# Patient Record
Sex: FEMALE | Race: WHITE | NOT HISPANIC OR LATINO | Employment: OTHER | URBAN - METROPOLITAN AREA
[De-identification: names, ages, dates, MRNs, and addresses within clinical notes are randomized per-mention and may not be internally consistent; named-entity substitution may affect disease eponyms.]

---

## 2020-09-23 PROBLEM — E10.10 DIABETIC KETOACIDOSIS WITHOUT COMA ASSOCIATED WITH TYPE 1 DIABETES MELLITUS (HCC): Status: ACTIVE | Noted: 2020-09-23

## 2020-09-23 PROBLEM — R74.8 ELEVATED LIPASE: Status: ACTIVE | Noted: 2020-09-23

## 2020-09-25 PROBLEM — E83.51 HYPOCALCEMIA: Status: ACTIVE | Noted: 2020-09-25

## 2022-07-11 ENCOUNTER — OFFICE VISIT (OUTPATIENT)
Dept: DERMATOLOGY | Facility: CLINIC | Age: 37
End: 2022-07-11
Payer: COMMERCIAL

## 2022-07-11 DIAGNOSIS — L80 VITILIGO: Primary | ICD-10-CM

## 2022-07-11 PROCEDURE — 96910 PHOTCHMTX TAR&UVB/PTRLTM&UVB: CPT | Performed by: DERMATOLOGY

## 2022-07-11 NOTE — PROGRESS NOTES
PHOTOTHERAPY    Treatment type: Phototherapy  Diagnosis: Vitiligo  Anatomical location: Upper extremity, Lower extremity  Treatment schedule: 2x weekly  Reaction: None  Increase %: 10%  mJoules: 927 (dose reduced by 50% due to pt missing 3 weeks of treatment)  Max dose: 2000  Topical: Mineral oil  Topical applied by: Patient  Special shield: Goggjanna  Tech: Siena Lau RN  Comments: Next appt:7/13

## 2022-07-13 ENCOUNTER — OFFICE VISIT (OUTPATIENT)
Dept: DERMATOLOGY | Facility: CLINIC | Age: 37
End: 2022-07-13
Payer: COMMERCIAL

## 2022-07-13 DIAGNOSIS — L80 VITILIGO: Primary | ICD-10-CM

## 2022-07-13 PROCEDURE — 96910 PHOTCHMTX TAR&UVB/PTRLTM&UVB: CPT | Performed by: DERMATOLOGY

## 2022-07-13 NOTE — PROGRESS NOTES
PHOTOTHERAPY    Treatment type: Phototherapy  Diagnosis: Vitiligo  Anatomical location: Upper extremity, Lower extremity  Treatment schedule: 2x weekly  Reaction: None  Increase %: 10%  mJoules: 1021  Max dose: 2000  Topical: Mineral oil  Topical applied by: Patient  Special shield: Laurie  Tech: Norris Bradford RN  Comments: Next appt: 7/25

## 2022-07-25 ENCOUNTER — TELEPHONE (OUTPATIENT)
Dept: DERMATOLOGY | Facility: CLINIC | Age: 37
End: 2022-07-25

## 2022-07-25 NOTE — TELEPHONE ENCOUNTER
Pt unable to make apt today (son is sick) nor Wed (son has doctors apt), pt sees the physician on Wed, 8/17 and will cb to schedule phototherapy apts after apt

## 2022-07-29 ENCOUNTER — OFFICE VISIT (OUTPATIENT)
Dept: DERMATOLOGY | Facility: CLINIC | Age: 37
End: 2022-07-29
Payer: COMMERCIAL

## 2022-07-29 DIAGNOSIS — L80 VITILIGO: Primary | ICD-10-CM

## 2022-07-29 PROCEDURE — 96910 PHOTCHMTX TAR&UVB/PTRLTM&UVB: CPT | Performed by: DERMATOLOGY

## 2022-07-29 NOTE — PROGRESS NOTES
PHOTOTHERAPY    Treatment type: Phototherapy  Diagnosis: Vitiligo  Anatomical location: Upper extremity, Lower extremity  Treatment schedule: 2x weekly  Reaction: None  Increase %: 10%  mJoules: 768 (dose decreased by 25% due to pt misisng 2 weeks of treatment)  Max dose: 2000  Topical: Mineral oil  Topical applied by: Patient  Special shield: Goggles  Tech: Glenn Patel RN  Comments: Next appt: 8/23

## 2022-08-17 ENCOUNTER — OFFICE VISIT (OUTPATIENT)
Dept: MULTI SPECIALTY CLINIC | Facility: CLINIC | Age: 37
End: 2022-08-17

## 2022-08-17 DIAGNOSIS — L80 VITILIGO: Primary | ICD-10-CM

## 2022-08-17 PROCEDURE — 99213 OFFICE O/P EST LOW 20 MIN: CPT | Performed by: DERMATOLOGY

## 2022-08-17 NOTE — PROGRESS NOTES
[facility] Follow Up Note    Patient Name: Vern Trevino  Encounter Date: 08/17/2022    Today's Chief Concerns:  [employer] Concern #1:  vitiligo fu      Current Medications:    Current Outpatient Medications:     Accu-Chek FastClix Lancets MISC, USE TO TEST BLOOD SUGAR 6 TIMES A DAY, Disp: , Rfl:     Accu-Chek Guide test strip, USE UP TO 6 TIMES A DAY, Disp: , Rfl:     Biotin 1 MG CAPS, Take by mouth, Disp: , Rfl:     calcium carbonate-vitamin D (OSCAL-D) 500 mg-200 units per tablet, Take 1 tablet by mouth 3 (three) times a day with meals, Disp: 90 tablet, Rfl: 0    insulin aspart (NovoLOG) 100 units/mL injection, Inject under the skin continuous  Via pump, Disp: , Rfl:     insulin lispro (Admelog) 100 units/mL injection, INJECT 60 UNITS VIA INSULIN PUMP DAILY, Disp: , Rfl:     multivitamin (THERAGRAN) TABS, Take 1 tablet by mouth daily, Disp: , Rfl:     naproxen (NAPROSYN) 500 mg tablet, Take 500 mg by mouth, Disp: , Rfl:     tacrolimus (PROTOPIC) 0 1 % ointment, Apply topically 2 (two) times a day, Disp: 60 g, Rfl: 1    CONSTITUTIONAL:   There were no vitals filed for this visit  Specific Alerts:    Have you been seen by a [facility] Dermatologist in the last 3 years? YES    Are you pregnant or planning to become pregnant? No    Are you currently or planning to be nursing or breast feeding? No    Allergies   Allergen Reactions    Augmentin [Amoxicillin-Pot Clavulanate] Rash       May we call your Preferred Phone number to discuss your specific medical information? No    May we leave a detailed message that includes your specific medical information? No    Have you traveled outside of the [facility] in the past 3 months? No      Review of Systems:  Have you recently had or currently have any of the following?     · Fever or chills: No  · Night Sweats: No  · Headaches: No  · Weight Gain: No  · Weight Loss: No  · Blurry Vision: No  · Nausea: No  · Vomiting: No  · Diarrhea: No  · Blood in Stool: No  · Abdominal Pain: No  · Itchy Skin: No  · Painful Joints: No  · Swollen Joints: No  · Muscle Pain: No  · Irregular Mole: No  · Sun Burn: No  · Dry Skin: No  · Skin Color Changes: No  · Scar or Keloid: No  · Cold Sores/Fever Blisters: No  · Bacterial Infections/MRSA: No  · Anxiety: No  · Depression: No  · Suicidal or Homicidal Thoughts: No      PSYCH: Normal mood and affect  EYES: Normal conjunctiva  ENT: Normal lips and oral mucosa  CARDIOVASCULAR: No edema  RESPIRATORY: Normal respirations  HEME/LYMPH/IMMUNO:  No regional lymphadenopathy except as noted below in ASSESSMENT AND PLAN BY DIAGNOSIS    FULL ORGAN SYSTEM SKIN EXAM (SKIN)   Hair, Scalp, Ears, Face Normal except as noted below in Assessment   Neck,  Normal except as noted below in Assessment   Right Arm/Hand/Fingers Normal except as noted below in Assessment   Left Arm/Hand/Fingers Normal except as noted below in Assessment   Right Leg, Foot, Toes Normal except as noted below in Assessment   Left Leg, Foot, Toes Normal except as noted below in Assessment       VITILIGO     Physical Exam:  § Anatomic Location Affected:  Bilateral forearms, hands, lower legs, feet  § Morphological Description:  Depigmented patches with perifollicular repigmentation on forearms, dorsal hands and fingers, and lower legs, improved compared to prior  § Pertinent Positives:  § Pertinent Negatives:     Assessment and Plan:  Based on a thorough discussion of this condition and the management approach to it (including a comprehensive discussion of the known risks, side effects and potential benefits of treatment), the patient (family) agrees to implement the following specific plan:  § Patient is responding well to treatment  Continue phototherapy 2-3 times per week  § Continue topical treatment with protopic 0 1% ointment   Apply two times daily to areas of pigment loss  § Follow up in 6 months                 Phototherapy Initiation  Skin Type:  II     Skin type Typical features Tanning ability    I Pale white skin, blue/green eyes, blond/red hair Always burns, does not tan   II Fair skin, blue eyes Burns easily, tans poorly   III Darker white skin Tans after initial burn   IV Light brown skin Burns minimally, tans easily   V Brown skin Rarely burns, tans darkly easily   VI Dark brown or black skin Never burns, always tans darkly         Treatment Type:  UVB     Schedule:  2 X WEEK      Secondary Treatment:  No     Topical Application:  Mineral oil      Starting Mjoules:  Skin type I & II : as per most recent treatment ; maximum  2000 mJ     Increase by 5-10% at each treatment     Diagnosis: Vitiligo     Recommendations: Apply mineral oil prior to phototherapy   Continue treatment 2-3 times per week

## 2022-08-17 NOTE — PATIENT INSTRUCTIONS
VITILIGO          Assessment and Plan:  Based on a thorough discussion of this condition and the management approach to it (including a comprehensive discussion of the known risks, side effects and potential benefits of treatment), the patient (family) agrees to implement the following specific plan:  Patient is responding well to treatment  Continue phototherapy 2-3 times per week  Continue topical treatment with protopic 0 1% ointment   Apply two times daily to areas of pigment loss  Follow up in 6 months

## 2022-08-25 ENCOUNTER — OFFICE VISIT (OUTPATIENT)
Dept: DERMATOLOGY | Facility: CLINIC | Age: 37
End: 2022-08-25
Payer: COMMERCIAL

## 2022-08-25 DIAGNOSIS — L80 VITILIGO: Primary | ICD-10-CM

## 2022-08-25 PROCEDURE — 96910 PHOTCHMTX TAR&UVB/PTRLTM&UVB: CPT | Performed by: DERMATOLOGY

## 2022-08-25 NOTE — PROGRESS NOTES
PHOTOTHERAPY    Treatment type: Phototherapy  Diagnosis: Vitiligo  Anatomical location: Upper extremity, Lower extremity  Treatment schedule: 2x weekly  Reaction: None  Increase %: 10%  mJoules: 390 (Dose decreased by 50% due to pt missing three weeks of treatment)  Max dose: 2000  Topical: Mineral oil  Topical applied by: Patient  Special shield: Laurie  Tech: [facility] RN  Comments: Next appt: 8/30

## 2022-08-30 ENCOUNTER — OFFICE VISIT (OUTPATIENT)
Dept: DERMATOLOGY | Facility: CLINIC | Age: 37
End: 2022-08-30
Payer: COMMERCIAL

## 2022-08-30 DIAGNOSIS — L80 VITILIGO: Primary | ICD-10-CM

## 2022-08-30 PROCEDURE — 96910 PHOTCHMTX TAR&UVB/PTRLTM&UVB: CPT | Performed by: DERMATOLOGY

## 2022-08-30 NOTE — PROGRESS NOTES
PHOTOTHERAPY    Treatment type: Phototherapy  Diagnosis: Vitiligo  Anatomical location: Upper extremity, Lower extremity  Treatment schedule: 2x weekly  Reaction: None  Increase %: 10%  mJoules: 435  Max dose: 2000  Topical: Mineral oil  Topical applied by: Patient  Special shield: Laurie  Tech: Dg Client RN  Comments: Next appt: 9/1

## 2022-09-01 ENCOUNTER — OFFICE VISIT (OUTPATIENT)
Dept: DERMATOLOGY | Facility: CLINIC | Age: 37
End: 2022-09-01
Payer: COMMERCIAL

## 2022-09-01 DIAGNOSIS — L80 VITILIGO: Primary | ICD-10-CM

## 2022-09-01 PROCEDURE — 96910 PHOTCHMTX TAR&UVB/PTRLTM&UVB: CPT | Performed by: DERMATOLOGY

## 2022-09-01 NOTE — PROGRESS NOTES
PHOTOTHERAPY    Treatment type: Phototherapy  Diagnosis: Vitiligo  Anatomical location: Upper extremity, Lower extremity  Treatment schedule: 2x weekly  Reaction: None  Increase %: 10%  mJoules: 480  Max dose: 2000  Topical: Mineral oil  Topical applied by: Patient  Special shield: Goggles  Tech: Renae Martinez RN  Comments: Next appt: 9/6

## 2022-09-06 ENCOUNTER — OFFICE VISIT (OUTPATIENT)
Dept: DERMATOLOGY | Facility: CLINIC | Age: 37
End: 2022-09-06
Payer: COMMERCIAL

## 2022-09-06 DIAGNOSIS — L80 VITILIGO: Primary | ICD-10-CM

## 2022-09-06 PROCEDURE — 96910 PHOTCHMTX TAR&UVB/PTRLTM&UVB: CPT | Performed by: DERMATOLOGY

## 2022-09-06 NOTE — PROGRESS NOTES
PHOTOTHERAPY    Treatment type: Phototherapy  Diagnosis: Vitiligo  Anatomical location: Upper extremity, Lower extremity  Treatment schedule: 2x weekly  Reaction: None  Increase %: 10%  mJoules: 529  Max dose: 2000  Topical: Mineral oil  Topical applied by: Patient  Special shield: Laurie  Tech: Kelsey Khan RN  Comments: Next appt: 9/8

## 2022-09-08 ENCOUNTER — OFFICE VISIT (OUTPATIENT)
Dept: DERMATOLOGY | Facility: CLINIC | Age: 37
End: 2022-09-08
Payer: COMMERCIAL

## 2022-09-08 DIAGNOSIS — L80 VITILIGO: Primary | ICD-10-CM

## 2022-09-08 PROCEDURE — 96910 PHOTCHMTX TAR&UVB/PTRLTM&UVB: CPT | Performed by: DERMATOLOGY

## 2022-09-08 NOTE — PROGRESS NOTES
PHOTOTHERAPY    Treatment type: Phototherapy  Diagnosis: Vitiligo  Anatomical location: Upper extremity, Lower extremity  Treatment schedule: 2x weekly  Reaction: None  Increase %: 10%  mJoules: 587  Max dose: 2000  Topical: Mineral oil  Topical applied by: Patient  Special shield: Goggjanna  Tech: Leeann Zavaleta RN  Comments: Next appt: 9/14

## 2022-09-14 ENCOUNTER — OFFICE VISIT (OUTPATIENT)
Dept: DERMATOLOGY | Facility: CLINIC | Age: 37
End: 2022-09-14
Payer: COMMERCIAL

## 2022-09-14 DIAGNOSIS — L80 VITILIGO: Primary | ICD-10-CM

## 2022-09-14 PROCEDURE — 96910 PHOTCHMTX TAR&UVB/PTRLTM&UVB: CPT | Performed by: DERMATOLOGY

## 2022-09-14 NOTE — PROGRESS NOTES
PHOTOTHERAPY    Treatment type: Phototherapy  Diagnosis: Vitiligo  Anatomical location: Upper extremity, Lower extremity  Treatment schedule: 2x weekly  Reaction: None  Increase %: 10%  mJoules: 648  Max dose: 2000  Topical: Mineral oil  Topical applied by: Patient  Special shield: Laurie  Tech: Lucas Quezada RN  Comments: Next appt: 9/19

## 2022-09-19 ENCOUNTER — OFFICE VISIT (OUTPATIENT)
Dept: DERMATOLOGY | Facility: CLINIC | Age: 37
End: 2022-09-19
Payer: COMMERCIAL

## 2022-09-19 DIAGNOSIS — L80 VITILIGO: Primary | ICD-10-CM

## 2022-09-19 PROCEDURE — 96910 PHOTCHMTX TAR&UVB/PTRLTM&UVB: CPT | Performed by: DERMATOLOGY

## 2022-09-19 NOTE — PROGRESS NOTES
PHOTOTHERAPY    Treatment type: Phototherapy  Diagnosis: Vitiligo  Anatomical location: Upper extremity, Lower extremity  Treatment schedule: 2x weekly  Reaction: None  Increase %: 10%  mJoules: 715  Max dose: 2000  Topical: Mineral oil  Topical applied by: Patient  Special shield: Laurie  Tech: Kena Colorado RN  Comments: Next appt: 9/21

## 2022-09-21 ENCOUNTER — OFFICE VISIT (OUTPATIENT)
Dept: DERMATOLOGY | Facility: CLINIC | Age: 37
End: 2022-09-21
Payer: COMMERCIAL

## 2022-09-21 DIAGNOSIS — L80 VITILIGO: Primary | ICD-10-CM

## 2022-09-21 PROCEDURE — 96910 PHOTCHMTX TAR&UVB/PTRLTM&UVB: CPT | Performed by: DERMATOLOGY

## 2022-09-21 NOTE — PROGRESS NOTES
PHOTOTHERAPY    Treatment type: Phototherapy  Diagnosis: Vitiligo  Anatomical location: Upper extremity, Lower extremity  Treatment schedule: 2x weekly  Reaction: Pink  Increase %: 10%  mJoules: 719 (dose held the same)  Max dose: 2000  Topical: Mineral oil  Topical applied by: Patient  Special shield: Goggles  Tech: Dru Collado RN  Comments: Next appt: 9/26

## 2022-09-26 ENCOUNTER — OFFICE VISIT (OUTPATIENT)
Dept: DERMATOLOGY | Facility: CLINIC | Age: 37
End: 2022-09-26
Payer: COMMERCIAL

## 2022-09-26 DIAGNOSIS — L80 VITILIGO: Primary | ICD-10-CM

## 2022-09-26 PROCEDURE — 96910 PHOTCHMTX TAR&UVB/PTRLTM&UVB: CPT | Performed by: DERMATOLOGY

## 2022-09-26 NOTE — PROGRESS NOTES
PHOTOTHERAPY    Treatment type: Phototherapy  Diagnosis: Vitiligo  Anatomical location: Upper extremity, Lower extremity  Treatment schedule: 2x weekly  Reaction: None  Increase %: 10%  mJoules: 794  Max dose: 2000  Topical: Mineral oil  Topical applied by: Patient  Special shield: Gojess  Tech: Sheng Spicer RN  Comments: Next appt: 9/28

## 2022-09-28 ENCOUNTER — OFFICE VISIT (OUTPATIENT)
Dept: DERMATOLOGY | Facility: CLINIC | Age: 37
End: 2022-09-28
Payer: COMMERCIAL

## 2022-09-28 DIAGNOSIS — L80 VITILIGO: Primary | ICD-10-CM

## 2022-09-28 PROCEDURE — 96910 PHOTCHMTX TAR&UVB/PTRLTM&UVB: CPT | Performed by: STUDENT IN AN ORGANIZED HEALTH CARE EDUCATION/TRAINING PROGRAM

## 2022-09-28 NOTE — PROGRESS NOTES
PHOTOTHERAPY    Treatment type: Phototherapy  Diagnosis: Vitiligo  Anatomical location: Upper extremity, Lower extremity  Treatment schedule: 2x weekly  Reaction: None  Increase %: 10%  mJoules: 876  Max dose: 2000  Topical: Mineral oil  Topical applied by: Patient  Special shield: Laurie  Tech: [facility] RN  Comments: Next appt: 10/3

## 2022-10-05 ENCOUNTER — OFFICE VISIT (OUTPATIENT)
Dept: DERMATOLOGY | Facility: CLINIC | Age: 37
End: 2022-10-05
Payer: COMMERCIAL

## 2022-10-05 DIAGNOSIS — L80 VITILIGO: Primary | ICD-10-CM

## 2022-10-05 PROCEDURE — 96910 PHOTCHMTX TAR&UVB/PTRLTM&UVB: CPT | Performed by: STUDENT IN AN ORGANIZED HEALTH CARE EDUCATION/TRAINING PROGRAM

## 2022-10-05 NOTE — PROGRESS NOTES
PHOTOTHERAPY    Treatment type: Phototherapy  Diagnosis: Vitiligo  Anatomical location: Upper extremity, Lower extremity  Treatment schedule: 2x weekly  Reaction: None  Increase %: 10%  mJoules: 881 (dose held the same due to pt missing one week of treatment)  Max dose: 2000  Topical: Mineral oil  Topical applied by: Patient  Special shield: Goggles  Tech: Elier Sanchez RN  Comments: Next appt: 10/12

## 2022-10-18 ENCOUNTER — TELEPHONE (OUTPATIENT)
Dept: DERMATOLOGY | Facility: CLINIC | Age: 37
End: 2022-10-18

## 2022-10-18 NOTE — TELEPHONE ENCOUNTER
PA renewal request for pt's phototherapy treatments submitted verbally to pt's insurance, [facility], with the rep named Carlos Domínguez on 10/18  Will await response  Telephone call to pt's insurance company to check the status of the PA  The rep stated no [place] [employer] is needed for the CPT code: 87526

## 2022-10-19 ENCOUNTER — OFFICE VISIT (OUTPATIENT)
Dept: DERMATOLOGY | Facility: CLINIC | Age: 37
End: 2022-10-19
Payer: COMMERCIAL

## 2022-10-19 DIAGNOSIS — L80 VITILIGO: Primary | ICD-10-CM

## 2022-10-19 PROCEDURE — 96910 PHOTCHMTX TAR&UVB/PTRLTM&UVB: CPT | Performed by: STUDENT IN AN ORGANIZED HEALTH CARE EDUCATION/TRAINING PROGRAM

## 2022-10-19 NOTE — PROGRESS NOTES
PHOTOTHERAPY    Treatment type: Phototherapy  Diagnosis: Vitiligo  Anatomical location: Upper extremity, Lower extremity  Treatment schedule: 2x weekly  Reaction: None  Increase %: 10%  mJoules: 665 (dose decreased by 25% due to pt missing two weeks of treatment)  Max dose: 2000  Topical: Mineral oil  Topical applied by: Patient  Special shield: Goggjanna  Tech: Katie Vitale RN  Comments: Next appt: 10/24

## 2022-10-24 ENCOUNTER — OFFICE VISIT (OUTPATIENT)
Dept: DERMATOLOGY | Facility: CLINIC | Age: 37
End: 2022-10-24
Payer: COMMERCIAL

## 2022-10-24 DIAGNOSIS — L80 VITILIGO: Primary | ICD-10-CM

## 2022-10-24 PROCEDURE — 96910 PHOTCHMTX TAR&UVB/PTRLTM&UVB: CPT | Performed by: DERMATOLOGY

## 2022-10-24 NOTE — PROGRESS NOTES
PHOTOTHERAPY    Treatment type: Phototherapy  Diagnosis: Vitiligo  Anatomical location: Upper extremity, Lower extremity  Treatment schedule: 2x weekly  Reaction: None  Increase %: 10%  mJoules: 735  Max dose: 2000  Topical: Mineral oil  Topical applied by: Patient  Special shield: Laurie  Tech: Laron Dias RN  Comments: Next appt: 10/26

## 2022-10-26 ENCOUNTER — TELEPHONE (OUTPATIENT)
Dept: DERMATOLOGY | Facility: CLINIC | Age: 37
End: 2022-10-26

## 2022-10-26 NOTE — TELEPHONE ENCOUNTER
Patient called and requested to cancel her appt for 10/26 as her son is sick, she will come in for her appt next Wednesday

## 2022-11-02 ENCOUNTER — OFFICE VISIT (OUTPATIENT)
Dept: DERMATOLOGY | Facility: CLINIC | Age: 37
End: 2022-11-02

## 2022-11-02 DIAGNOSIS — L80 VITILIGO: Primary | ICD-10-CM

## 2022-11-02 NOTE — PROGRESS NOTES
PHOTOTHERAPY    Treatment type: Phototherapy  Diagnosis: Vitiligo  Anatomical location: Upper extremity, Lower extremity  Treatment schedule: 2x weekly  Reaction: None  Increase %: Other (dose held the same due to pt missing one week of treatment; Typically would increase by 10%)  mJoules: 737  Max dose: 2000  Topical: Mineral oil  Topical applied by: Patient  Special shield: Goggles  Tech: Demarco Murrieta RN  Comments: Next appt: 11/4/2022

## 2022-11-04 ENCOUNTER — OFFICE VISIT (OUTPATIENT)
Dept: DERMATOLOGY | Facility: CLINIC | Age: 37
End: 2022-11-04

## 2022-11-04 DIAGNOSIS — L80 VITILIGO: Primary | ICD-10-CM

## 2022-11-04 NOTE — PROGRESS NOTES
PHOTOTHERAPY    Treatment type: Phototherapy  Diagnosis: Vitiligo  Anatomical location: Upper extremity, Lower extremity  Treatment schedule: 2x weekly  Reaction: None  Increase %: 10%  mJoules: 812  Max dose: 2000  Topical: Mineral oil  Topical applied by: Patient  Special shield: Goggjanna  Tech: Demarco Murrieta RN  Comments: Next appt: 11/7/2022

## 2022-11-11 ENCOUNTER — TELEPHONE (OUTPATIENT)
Dept: DERMATOLOGY | Facility: CLINIC | Age: 37
End: 2022-11-11

## 2022-11-11 NOTE — TELEPHONE ENCOUNTER
LVM for pt asking her to verify that she has changed her last name with the Ins    every time she comes for phototherapy pre-reg is not going through   JF

## 2022-11-14 ENCOUNTER — OFFICE VISIT (OUTPATIENT)
Dept: DERMATOLOGY | Facility: CLINIC | Age: 37
End: 2022-11-14

## 2022-11-14 DIAGNOSIS — L80 VITILIGO: Primary | ICD-10-CM

## 2022-11-14 NOTE — PROGRESS NOTES
PHOTOTHERAPY    Treatment type: Phototherapy  Diagnosis: Vitiligo  Anatomical location: Upper extremity, Lower extremity  Treatment schedule: 2x weekly  Reaction: None  Increase %: Other (Dose held the same due to patient missing one week of treatment; typically would increase by 10%)  mJoules: 813  Max dose: 2000  Topical: Mineral oil  Topical applied by: Patient  Special shield: Goggles  Tech: Wallace Neal RN  Comments: Next appt 11/16/22

## 2022-11-16 ENCOUNTER — OFFICE VISIT (OUTPATIENT)
Dept: DERMATOLOGY | Facility: CLINIC | Age: 37
End: 2022-11-16

## 2022-11-16 DIAGNOSIS — L80 VITILIGO: Primary | ICD-10-CM

## 2022-11-16 NOTE — PROGRESS NOTES
PHOTOTHERAPY    Treatment type: Phototherapy  Diagnosis: Vitiligo  Anatomical location: Upper extremity, Lower extremity  Treatment schedule: 2x weekly  Reaction: None  Increase %: 10%  mJoules: 898  Max dose: 2000  Topical: Mineral oil  Topical applied by: Patient  Special shield: Goggjanna  Tech: Patricia Falk RN  Comments: Next appt 11/21/22

## 2022-11-21 ENCOUNTER — OFFICE VISIT (OUTPATIENT)
Dept: DERMATOLOGY | Facility: CLINIC | Age: 37
End: 2022-11-21

## 2022-11-21 DIAGNOSIS — L80 VITILIGO: Primary | ICD-10-CM

## 2022-11-21 NOTE — PROGRESS NOTES
PHOTOTHERAPY    Treatment type: Phototherapy  Diagnosis: Vitiligo  Anatomical location: Upper extremity, Lower extremity  Treatment schedule: 2x weekly  Reaction: None  Increase %: 10%  mJoules: 989  Max dose: 2000  Topical: Mineral oil  Topical applied by: Patient  Special shield: Laurie  Tech: Shantanu Tate RN  Comments: Next appt 11/23/22

## 2022-12-05 ENCOUNTER — OFFICE VISIT (OUTPATIENT)
Dept: DERMATOLOGY | Facility: CLINIC | Age: 37
End: 2022-12-05

## 2022-12-05 DIAGNOSIS — L80 VITILIGO: Primary | ICD-10-CM

## 2022-12-05 NOTE — PROGRESS NOTES
PHOTOTHERAPY    Treatment type: Phototherapy  Diagnosis: Vitiligo  Anatomical location: Upper extremity, Lower extremity  Treatment schedule: 2x weekly  Reaction: None  Increase %: Other (Dose decreased by 25% due to pt missing two weeks of treatment; Typically would increase by 10% )  mJoules: 744  Max dose: 2000  Topical: Mineral oil  Topical applied by: Patient  Special shield: Goggles  Tech: Siena Lau RN  Comments: Next appt: 12/7/2022

## 2022-12-12 ENCOUNTER — OFFICE VISIT (OUTPATIENT)
Dept: DERMATOLOGY | Facility: CLINIC | Age: 37
End: 2022-12-12

## 2022-12-12 DIAGNOSIS — L80 VITILIGO: Primary | ICD-10-CM

## 2022-12-12 NOTE — PROGRESS NOTES
PHOTOTHERAPY    Treatment type: Phototherapy  Diagnosis: Vitiligo  Anatomical location: Upper extremity, Lower extremity  Treatment schedule: 2x weekly  Reaction: None  Increase %: Other (dose held the same due to pt missing one week of treatment; Typically would increase by 10%)  mJoules: 749  Max dose: 2000  Topical: Mineral oil  Topical applied by: Patient  Special shield: Goggles  Tech: Gelnn Patel RN  Comments: Next appt: 12/14/2022

## 2022-12-19 ENCOUNTER — OFFICE VISIT (OUTPATIENT)
Dept: DERMATOLOGY | Facility: CLINIC | Age: 37
End: 2022-12-19

## 2022-12-19 DIAGNOSIS — L80 VITILIGO: Primary | ICD-10-CM

## 2022-12-19 NOTE — PROGRESS NOTES
PHOTOTHERAPY    Treatment type: Phototherapy  Diagnosis: Vitiligo  Anatomical location: Upper extremity, Lower extremity  Treatment schedule: 2x weekly  Reaction: None  Increase %: Other (dose held the same due to pt missing one week of treatment; Typically would increase by 10%)  mJoules: 754  Max dose: 2000  Topical: Mineral oil  Topical applied by: Patient  Special shield: Goggles  Tech: Eri Ramires RN  Comments: Next appt: 12/28/2022

## 2022-12-28 ENCOUNTER — OFFICE VISIT (OUTPATIENT)
Dept: DERMATOLOGY | Facility: CLINIC | Age: 37
End: 2022-12-28

## 2022-12-28 DIAGNOSIS — L80 VITILIGO: Primary | ICD-10-CM

## 2022-12-28 NOTE — PROGRESS NOTES
PHOTOTHERAPY    Treatment type: Phototherapy  Diagnosis: Vitiligo  Anatomical location: Upper extremity, Lower extremity  Treatment schedule: 2x weekly  Reaction: None  Increase %: Other (dose held the same due to pt missing one week of treatment; Typically would increase by 10%)  mJoules: 757  Max dose: 2000  Topical: Mineral oil  Topical applied by: Patient  Special shield: Goggles  Tech: Miguel Ángel May RN  Comments: Next appt: 1/3/2023

## 2023-01-04 ENCOUNTER — OFFICE VISIT (OUTPATIENT)
Dept: DERMATOLOGY | Facility: CLINIC | Age: 38
End: 2023-01-04

## 2023-01-04 DIAGNOSIS — L80 VITILIGO: Primary | ICD-10-CM

## 2023-01-04 NOTE — PROGRESS NOTES
PHOTOTHERAPY    Treatment type: Phototherapy  Diagnosis: Vitiligo  Anatomical location: Upper extremity, Lower extremity  Treatment schedule: 2x weekly  Reaction: Other (Mild tingling reported after last treatment that went away in 24hrs )  Increase %: Other (dose held the same due to pt missing one week of treatment; Typically would increase by 10%)  mJoules: 757  Max dose: 2000  Topical: Mineral oil  Topical applied by: Patient  Special shield: Goggles  Tech: Kelsey Khan RN  Comments: Next appt: 1/9/2023

## 2023-01-09 ENCOUNTER — OFFICE VISIT (OUTPATIENT)
Dept: DERMATOLOGY | Facility: CLINIC | Age: 38
End: 2023-01-09

## 2023-01-09 DIAGNOSIS — L80 VITILIGO: Primary | ICD-10-CM

## 2023-01-09 NOTE — PROGRESS NOTES
PHOTOTHERAPY    Treatment type: Phototherapy  Diagnosis: Vitiligo  Anatomical location: Upper extremity, Lower extremity  Treatment schedule: 2x weekly  Reaction: None  Increase %: 10%  mJoules: 836  Max dose: 2000  Topical: Mineral oil  Topical applied by: Patient  Special shield: Laurie  Tech: Sondra Ro RN  Comments: Next appt: 1/11/23

## 2023-01-11 ENCOUNTER — OFFICE VISIT (OUTPATIENT)
Dept: DERMATOLOGY | Facility: CLINIC | Age: 38
End: 2023-01-11

## 2023-01-11 DIAGNOSIS — L80 VITILIGO: Primary | ICD-10-CM

## 2023-01-11 NOTE — PROGRESS NOTES
PHOTOTHERAPY    Treatment type: Phototherapy  Diagnosis: Vitiligo  Anatomical location: Upper extremity, Lower extremity  Treatment schedule: 2x weekly  Reaction: None  Increase %: 10%  mJoules: 917  Max dose: 2000  Topical: Mineral oil  Topical applied by: Patient  Special shield: Laurie  Tech: Isabell Monae RN  Comments: Next appt: 1/16

## 2023-01-23 ENCOUNTER — OFFICE VISIT (OUTPATIENT)
Dept: DERMATOLOGY | Facility: CLINIC | Age: 38
End: 2023-01-23

## 2023-01-23 DIAGNOSIS — L80 VITILIGO: Primary | ICD-10-CM

## 2023-01-23 NOTE — PROGRESS NOTES
PHOTOTHERAPY    Treatment type: Phototherapy  Diagnosis: Vitiligo  Anatomical location: Upper extremity, Lower extremity  Treatment schedule: 2x weekly  Reaction: None  Increase %: Other (dose decreased by 25% due to pt missing two weeks of treatment; Typically would increase by 10%)  mJoules: 691  Max dose: 2000  Topical: Mineral oil  Topical applied by: Patient  Special shield: Goggles  Tech: Екатерина Baez RN  Comments: Next appt: 1/25

## 2023-02-01 ENCOUNTER — OFFICE VISIT (OUTPATIENT)
Dept: DERMATOLOGY | Facility: CLINIC | Age: 38
End: 2023-02-01

## 2023-02-01 DIAGNOSIS — L80 VITILIGO: Primary | ICD-10-CM

## 2023-02-01 NOTE — PROGRESS NOTES
PHOTOTHERAPY    Treatment type: Phototherapy  Diagnosis: Vitiligo  Anatomical location: Upper extremity, Lower extremity  Treatment schedule: 2x weekly  Reaction: None  Increase %: Other (dose held the same due to pt missing one week of treatment; Typically would increase by 10%)  mJoules: 694  Max dose: 2000  Topical: Mineral oil  Topical applied by: Patient  Special shield: Goggles  Tech: Jean Marie Zayas RN  Comments: Next appt: 2/6

## 2023-02-06 ENCOUNTER — OFFICE VISIT (OUTPATIENT)
Dept: DERMATOLOGY | Facility: CLINIC | Age: 38
End: 2023-02-06

## 2023-02-06 DIAGNOSIS — L80 VITILIGO: Primary | ICD-10-CM

## 2023-02-06 NOTE — PROGRESS NOTES
PHOTOTHERAPY    Treatment type: Phototherapy  Diagnosis: Vitiligo  Anatomical location: Upper extremity, Lower extremity  Treatment schedule: 2x weekly  Reaction: None  Increase %: 10%  mJoules: 764  Max dose: 2000  Topical: Mineral oil  Topical applied by: Patient  Special shield: Laurie  Tech: Isak Cook RN  Comments: Next appt: 2/8

## 2023-02-08 ENCOUNTER — OFFICE VISIT (OUTPATIENT)
Dept: DERMATOLOGY | Facility: CLINIC | Age: 38
End: 2023-02-08

## 2023-02-08 DIAGNOSIS — L80 VITILIGO: Primary | ICD-10-CM

## 2023-02-08 NOTE — PROGRESS NOTES
PHOTOTHERAPY    Treatment type: Phototherapy  Diagnosis: Vitiligo  Anatomical location: Upper extremity, Lower extremity  Treatment schedule: 2x weekly  Reaction: None  Increase %: 10%  mJoules: 846  Max dose: 2000  Topical: Mineral oil  Topical applied by: Patient  Special shield: Laurie  Tech: Vivian Atkinson RN  Comments: Next appt: 2/13

## 2023-02-13 ENCOUNTER — OFFICE VISIT (OUTPATIENT)
Dept: DERMATOLOGY | Facility: CLINIC | Age: 38
End: 2023-02-13

## 2023-02-13 DIAGNOSIS — L80 VITILIGO: Primary | ICD-10-CM

## 2023-02-13 NOTE — PROGRESS NOTES
PHOTOTHERAPY    Treatment type: Phototherapy  Diagnosis: Vitiligo  Anatomical location: Upper extremity, Lower extremity  Treatment schedule: 2x weekly  Reaction: None  Increase %: 10%  mJoules: 930  Max dose: 2000  Topical: Mineral oil  Topical applied by: Patient  Special shield: Laurie  Tech: Maksim Ornelas RN  Comments: Next appt: 2/15

## 2023-02-15 ENCOUNTER — OFFICE VISIT (OUTPATIENT)
Dept: DERMATOLOGY | Facility: CLINIC | Age: 38
End: 2023-02-15

## 2023-02-15 DIAGNOSIS — L80 VITILIGO: Primary | ICD-10-CM

## 2023-02-15 NOTE — PROGRESS NOTES
PHOTOTHERAPY    Treatment type: Phototherapy  Diagnosis: Vitiligo  Anatomical location: Upper extremity, Lower extremity  Treatment schedule: 2x weekly  Reaction: None  Increase %: 10%  mJoules: 1022  Max dose: 2000  Topical: Mineral oil  Topical applied by: Patient  Special shield: Laurie  Tech: Miri Espinal RN  Comments: Next appt: 2/21

## 2023-02-21 ENCOUNTER — OFFICE VISIT (OUTPATIENT)
Dept: DERMATOLOGY | Facility: CLINIC | Age: 38
End: 2023-02-21

## 2023-02-21 DIAGNOSIS — L80 VITILIGO: Primary | ICD-10-CM

## 2023-02-21 NOTE — PROGRESS NOTES
PHOTOTHERAPY    Treatment type: Phototherapy  Diagnosis: Vitiligo  Anatomical location: Upper extremity, Lower extremity  Treatment schedule: 2x weekly  Reaction: None  Increase %: Other (dose held the same due to pt  missing on week of treatment)  mJoules: 1024  Max dose: 2000  Topical: Mineral oil  Topical applied by: Patient  Special shield: Goggles  Tech: Dg Myers RN  Comments: Next appt: 2/23

## 2023-02-24 ENCOUNTER — OFFICE VISIT (OUTPATIENT)
Dept: DERMATOLOGY | Facility: CLINIC | Age: 38
End: 2023-02-24

## 2023-02-24 DIAGNOSIS — L80 VITILIGO: Primary | ICD-10-CM

## 2023-02-24 NOTE — PROGRESS NOTES
PHOTOTHERAPY    Treatment type: Phototherapy  Diagnosis: Vitiligo  Anatomical location: Upper extremity, Lower extremity  Treatment schedule: 2x weekly  Reaction: None  Increase %: 10%  mJoules: 1125  Max dose: 2000  Topical: Mineral oil  Topical applied by: Patient  Special shield: Goggles  Tech: Jan Greenwood RN  Comments: Next appt: 2/27

## 2023-02-27 ENCOUNTER — OFFICE VISIT (OUTPATIENT)
Dept: DERMATOLOGY | Facility: CLINIC | Age: 38
End: 2023-02-27

## 2023-02-27 DIAGNOSIS — L80 VITILIGO: Primary | ICD-10-CM

## 2023-02-27 NOTE — PROGRESS NOTES
PHOTOTHERAPY    Treatment type: Phototherapy  Diagnosis: Vitiligo  Anatomical location: Upper extremity, Lower extremity  Treatment schedule: 2x weekly  Reaction: Pain  Increase %: Other (dose decreased by 20%)  mJoules: 902  Max dose: 2000  Topical: Mineral oil  Topical applied by: Patient  Special shield: Goggles  Tech: Enzo Bartlett RN  Comments: Next appt: 3/1

## 2023-03-01 ENCOUNTER — OFFICE VISIT (OUTPATIENT)
Dept: DERMATOLOGY | Facility: CLINIC | Age: 38
End: 2023-03-01

## 2023-03-01 DIAGNOSIS — L80 VITILIGO: Primary | ICD-10-CM

## 2023-03-01 NOTE — PROGRESS NOTES
PHOTOTHERAPY    Treatment type: Phototherapy  Diagnosis: Vitiligo  Anatomical location: Upper extremity, Lower extremity  Treatment schedule: 2x weekly  Reaction: None  Increase %: Other (dose held the same)  mJoules: 905  Max dose: 2000  Topical: Mineral oil  Topical applied by: Patient  Special shield: Goggles  Tech: Екатерина Baez RN  Comments: Next appt: 3/6

## 2023-03-08 ENCOUNTER — OFFICE VISIT (OUTPATIENT)
Dept: DERMATOLOGY | Facility: CLINIC | Age: 38
End: 2023-03-08

## 2023-03-08 DIAGNOSIS — L80 VITILIGO: Primary | ICD-10-CM

## 2023-03-08 NOTE — PROGRESS NOTES
PHOTOTHERAPY    Treatment type: Phototherapy  Diagnosis: Vitiligo  Anatomical location: Upper extremity, Lower extremity  Treatment schedule: 2x weekly  Reaction: None  Increase %: Other (dose decreased by 10% due to installing a new phototherapy donohue)  mJoules: 818  Max dose: 2000  Topical: Mineral oil  Topical applied by: Patient  Special shield: Goggles (Pt also covers face in donohue)  Tech: Lona Casey RN  Comments: Next appt: 3/13

## 2023-03-15 ENCOUNTER — OFFICE VISIT (OUTPATIENT)
Dept: DERMATOLOGY | Facility: CLINIC | Age: 38
End: 2023-03-15

## 2023-03-15 DIAGNOSIS — L80 VITILIGO: Primary | ICD-10-CM

## 2023-03-15 NOTE — PROGRESS NOTES
PHOTOTHERAPY    Treatment type: Phototherapy  Diagnosis: Vitiligo  Anatomical location: Upper extremity, Lower extremity  Treatment schedule: 2x weekly  Reaction: None  Increase %: Other (dose held the same due to pt missing one week of treatment)  mJoules: 820  Max dose: 2000  Topical: Mineral oil  Topical applied by: Patient  Special shield: Goggles  Tech: Herminia Cunha RN  Comments: Next appt: 3/20

## 2023-03-22 ENCOUNTER — OFFICE VISIT (OUTPATIENT)
Dept: DERMATOLOGY | Facility: CLINIC | Age: 38
End: 2023-03-22

## 2023-03-22 DIAGNOSIS — L80 VITILIGO: Primary | ICD-10-CM

## 2023-03-22 NOTE — PROGRESS NOTES
PHOTOTHERAPY    Treatment type: Phototherapy  Diagnosis: Vitiligo  Anatomical location: Upper extremity, Lower extremity  Treatment schedule: 2x weekly  Reaction: None  Increase %: Other (dose held the same)  mJoules: 822  Max dose: 2000  Topical: Mineral oil  Topical applied by: Patient  Special shield: Goggles  Tech: Jan Greenwood RN  Comments: Next appt: 3/27

## 2023-03-27 ENCOUNTER — OFFICE VISIT (OUTPATIENT)
Dept: DERMATOLOGY | Facility: CLINIC | Age: 38
End: 2023-03-27

## 2023-03-27 DIAGNOSIS — L80 VITILIGO: Primary | ICD-10-CM

## 2023-03-27 NOTE — PROGRESS NOTES
PHOTOTHERAPY    Treatment type: Phototherapy  Diagnosis: Vitiligo  Anatomical location: Upper extremity, Lower extremity  Treatment schedule: 2x weekly  Reaction: None  Increase %: 10%  mJoules: 905  Max dose: 2000  Topical: Mineral oil  Topical applied by: Patient  Special shield: Laurie  Tech: Misha Kelly RN  Comments: Next appt: 3/29

## 2023-03-29 ENCOUNTER — OFFICE VISIT (OUTPATIENT)
Dept: DERMATOLOGY | Facility: CLINIC | Age: 38
End: 2023-03-29

## 2023-03-29 DIAGNOSIS — L80 VITILIGO: Primary | ICD-10-CM

## 2023-03-29 NOTE — PROGRESS NOTES
PHOTOTHERAPY    Treatment type: Phototherapy  Diagnosis: Vitiligo  Anatomical location: Upper extremity, Lower extremity  Treatment schedule: 2x weekly  Reaction: Pink  Increase %: Other (dose held the same)  mJoules: 907  Max dose: 2000  Topical: Mineral oil  Topical applied by: Patient  Special shield: Goggles (Pt covers face in donohue)  Tech: Rell Connors RN  Comments: Next appt: 4/5

## 2023-04-24 ENCOUNTER — OFFICE VISIT (OUTPATIENT)
Dept: DERMATOLOGY | Facility: CLINIC | Age: 38
End: 2023-04-24

## 2023-04-24 DIAGNOSIS — L80 VITILIGO: Primary | ICD-10-CM

## 2023-04-24 NOTE — PROGRESS NOTES
PHOTOTHERAPY    Treatment type: Phototherapy  Diagnosis: Vitiligo  Anatomical location: Upper extremity, Lower extremity  Treatment schedule: 2x weekly  Reaction: None  Increase %: 10%  mJoules: 1102  Max dose: 2000  Topical: Mineral oil  Topical applied by: Patient  Special shield: Goggles (Pt also covers face in donohue)  Tech: Collette Morejon RN  Comments: Next appt: 4/26

## 2023-04-26 ENCOUNTER — OFFICE VISIT (OUTPATIENT)
Dept: DERMATOLOGY | Facility: CLINIC | Age: 38
End: 2023-04-26

## 2023-04-26 DIAGNOSIS — L80 VITILIGO: Primary | ICD-10-CM

## 2023-04-26 NOTE — PROGRESS NOTES
PHOTOTHERAPY    Treatment type: Phototherapy  Diagnosis: Vitiligo  Anatomical location: Upper extremity, Lower extremity  Treatment schedule: 2x weekly  Reaction: None  Increase %: 10%  mJoules: 1212  Max dose: 2000  Topical: Mineral oil  Topical applied by: Patient  Special shield: Goggles (Pt also covera face in donohue)  Tech: Saul Zurita RN  Comments: Next appt: 5/1

## 2023-05-01 ENCOUNTER — OFFICE VISIT (OUTPATIENT)
Dept: DERMATOLOGY | Facility: CLINIC | Age: 38
End: 2023-05-01

## 2023-05-01 DIAGNOSIS — L80 VITILIGO: Primary | ICD-10-CM

## 2023-05-01 NOTE — PROGRESS NOTES
PHOTOTHERAPY    Treatment type: Phototherapy  Diagnosis: Vitiligo  Anatomical location: Upper extremity, Lower extremity  Treatment schedule: 2x weekly  Reaction: Itching (Pt encouraged to moisture 3x/day)  Increase %: Other (dose held the same)  mJoules: 1213  Max dose: 2000  Topical: Mineral oil  Topical applied by: Patient  Special shield: Goggles (Pt also covers face in donohue)  Tech: Chantel Mccallum RN  Comments: Next appt: 5/3

## 2023-05-08 ENCOUNTER — OFFICE VISIT (OUTPATIENT)
Dept: DERMATOLOGY | Facility: CLINIC | Age: 38
End: 2023-05-08

## 2023-05-08 DIAGNOSIS — L80 VITILIGO: Primary | ICD-10-CM

## 2023-05-08 NOTE — PROGRESS NOTES
PHOTOTHERAPY    Treatment type: Phototherapy  Diagnosis: Vitiligo  Anatomical location: Upper extremity, Lower extremity  Treatment schedule: 2x weekly  Reaction: None  Increase %: Other (dose held the same due to pt missing one week of treatment)  mJoules: 1216  Max dose: 2000  Topical: Mineral oil  Topical applied by: Patient  Special shield: Goggles (Pt also covers face in donohue)  Tech: Albania Giles RN  Comments: Next appt: 5/10

## 2023-05-10 ENCOUNTER — OFFICE VISIT (OUTPATIENT)
Dept: DERMATOLOGY | Facility: CLINIC | Age: 38
End: 2023-05-10

## 2023-05-10 DIAGNOSIS — L80 VITILIGO: Primary | ICD-10-CM

## 2023-05-10 NOTE — PROGRESS NOTES
PHOTOTHERAPY    Treatment type: Phototherapy  Diagnosis: Vitiligo  Anatomical location: Upper extremity, Lower extremity  Treatment schedule: 2x weekly  Reaction: None  Increase %: Other (dose held the same per pt preference)  mJoules: 1215  Max dose: 2000  Topical: Mineral oil  Topical applied by: Patient  Special shield: Goggles (Pt also covers face in donohue)  Tech: Eryn Raygoza RN  Comments: Next appt; 5/17

## 2023-05-16 ENCOUNTER — OFFICE VISIT (OUTPATIENT)
Dept: DERMATOLOGY | Facility: CLINIC | Age: 38
End: 2023-05-16

## 2023-05-16 DIAGNOSIS — L80 VITILIGO: Primary | ICD-10-CM

## 2023-05-16 NOTE — PROGRESS NOTES
Anesthesia Post Evaluation    Patient: Heydi Goodman    Procedure(s) Performed: Procedure(s) (LRB):  LAPAROSCOPY, DIAGNOSTIC; ECTOPIC  (N/A)  DILATION AND CURETTAGE, UTERUS (N/A)    Final Anesthesia Type: general    Patient location during evaluation: PACU  Patient participation: Yes- Able to Participate  Level of consciousness: awake and alert  Post-procedure vital signs: reviewed and stable  Pain management: adequate  Airway patency: patent    PONV status at discharge: No PONV  Anesthetic complications: no      Cardiovascular status: blood pressure returned to baseline  Respiratory status: unassisted  Hydration status: euvolemic  Follow-up not needed.          Vitals Value Taken Time   /55 08/07/20 1603   Temp 36.7 08/07/20 1609   Pulse 83 08/07/20 1609   Resp 18 08/07/20 1551   SpO2 95 % 08/07/20 1609   Vitals shown include unvalidated device data.      No case tracking events are documented in the log.      Pain/Jessie Score: Pain Rating Prior to Med Admin: 4 (8/7/2020  3:51 PM)  Pain Rating Post Med Admin: 4 (8/7/2020  3:51 PM)  Jessie Score: 9 (8/7/2020  4:00 PM)         PHOTOTHERAPY    Treatment type: Phototherapy  Diagnosis: Vitiligo  Anatomical location: Upper extremity, Lower extremity  Treatment schedule: 2x weekly  Reaction: None  Increase %: Other (dose held the same)  mJoules: 1215  Max dose: 2000  Topical: Mineral oil  Topical applied by: Patient  Special shield: Goggles (Pt also covers face in donohue)  Tech: Eryn Raygoza RN  Comments: next appt: 5/18

## 2023-05-17 ENCOUNTER — OFFICE VISIT (OUTPATIENT)
Dept: MULTI SPECIALTY CLINIC | Facility: CLINIC | Age: 38
End: 2023-05-17

## 2023-05-17 VITALS — WEIGHT: 144 LBS | TEMPERATURE: 98.1 F | BODY MASS INDEX: 28.27 KG/M2 | HEIGHT: 60 IN

## 2023-05-17 DIAGNOSIS — L80 VITILIGO: ICD-10-CM

## 2023-05-17 RX ORDER — TACROLIMUS 1 MG/G
OINTMENT TOPICAL 2 TIMES DAILY
Qty: 60 G | Refills: 1 | Status: SHIPPED | OUTPATIENT
Start: 2023-05-17

## 2023-05-17 RX ORDER — INSULIN LISPRO 100 U/ML
INJECTION, SOLUTION INTRAVENOUS; SUBCUTANEOUS
COMMUNITY
Start: 2023-02-13

## 2023-05-17 RX ORDER — INSULIN ASPART INJECTION 100 [IU]/ML
INJECTION, SOLUTION SUBCUTANEOUS
COMMUNITY
Start: 2023-05-05

## 2023-05-17 NOTE — PROGRESS NOTES
"[facility]     Patient Name: Noelle Traore  Encounter Date: 5/17/2023     Have you been cared for by a [facility] Dermatologist in the last 3 years and, if so, which description applies to you? Yes  I have been here within the last 3 years, and my medical history has NOT changed since that time  I am FEMALE/of child-bearing potential     REVIEW OF SYSTEMS:  Have you recently had or currently have any of the following? · No changes in my recent health  PAST MEDICAL HISTORY:  Have you personally ever had or currently have any of the following? If \"YES,\" then please provide more detail  · No changes in my medical history  FAMILY HISTORY:  Any \"first degree relatives\" (parent, brother, sister, or child) with the following? • No changes in my family's known health  PATIENT EXPERIENCE:    • Do you want the Dermatologist to perform a COMPLETE skin exam today including a clinical examination under the \"bra and underwear\" areas? NO  • If necessary, do we have your permission to call and leave a detailed message on your Preferred Phone number that includes your specific medical information?   Yes      Allergies   Allergen Reactions   • Augmentin [Amoxicillin-Pot Clavulanate] Rash      Current Outpatient Medications:   •  Accu-Chek FastClix Lancets MISC, USE TO TEST BLOOD SUGAR 6 TIMES A DAY, Disp: , Rfl:   •  Accu-Chek Guide test strip, USE UP TO 6 TIMES A DAY, Disp: , Rfl:   •  Admelog 100 UNIT/ML injection, , Disp: , Rfl:   •  Biotin 1 MG CAPS, Take by mouth, Disp: , Rfl:   •  calcium carbonate-vitamin D (OSCAL-D) 500 mg-200 units per tablet, Take 1 tablet by mouth 3 (three) times a day with meals, Disp: 90 tablet, Rfl: 0  •  Fiasp 100 UNIT/ML SOLN, USE IN INSULIN PUMP FOR TOTAL DAILY DOSE 60 UNITS DAILY, Disp: , Rfl:   •  multivitamin (THERAGRAN) TABS, Take 1 tablet by mouth daily, Disp: , Rfl:   •  tacrolimus (PROTOPIC) 0 1 % ointment, Apply topically 2 (two) times a day, Disp: 60 g, " Rfl: 1  •  insulin aspart (NovoLOG) 100 units/mL injection, Inject under the skin continuous  Via pump (Patient not taking: Reported on 5/17/2023), Disp: , Rfl:   •  insulin lispro (HumaLOG) 100 units/mL injection, INJECT 60 UNITS VIA INSULIN PUMP DAILY (Patient not taking: Reported on 5/17/2023), Disp: , Rfl:   •  naproxen (NAPROSYN) 500 mg tablet, Take 500 mg by mouth (Patient not taking: Reported on 5/17/2023), Disp: , Rfl:           • Whom besides the patient is providing clinical information about today's encounter?   o NO ADDITIONAL HISTORIAN (patient alone provided history)    Physical Exam and Assessment/Plan by Diagnosis:    VITILIGO    Physical Exam:  • Anatomic Location: face, arms, legs   • Morphologic Description:  Well-defined white patches  o CURRENT Body Surface Area Affected: 10%  o Segmental distribution? No  o Evidence of repigmentation? YES  • Pertinent Positives:  o Enlarged thyroid or nodules palpated? No  • Pertinent Negatives: Additional History of Present Condition:  Patient has been on phototherapy with good improvement over the past 2 years  Also using tacrolimus at this time  Failed topical steroids in the past  Patient is happy with results and would like to continue on current regimen  She is inquiring about opzelura  • Present since or around birth? No  • Family history of thyroid disorders or other autoimmunity: YES  • Has previously tried the following treatments: topical steroids, topical tacrolimus, phototherapy         Plan:  Vitiligo is an autoimmune condition where the body's immune system attacks normal pigment-making cells (melanocytes) in the skin  Vitiligo may be linked to the development of other autoimmune conditions, including thyroid disease  Workup may include labs to check for specific antibodies and thyroid function  Discussed potential disease courses  In 10-20% of cases, spontaneous repigmentation will occur   Darkening of the skin may occur in areas of "repigmentation  Segmental vitiligo, localized to a single strip of skin, is usually not associated with widespread depigmentation  Continue to monitor for any changes that arise  Return to clinic if patches increase or more patches develop  Avoid rubbing or applying friction to areas  The affected areas of skin naturally have less protection from the sun and tend to sunburn  Discussed sun protection measures and avoiding tanning to minimize the difference in color contrast between normal and affected skin  Topical CALCINEURIN INHIBITOR:  PROTOPIC (tacrolimus) PROTOPIC (tacrolimus) 0 1% ointment (approved for ages 12 years and older)  MAINTENANCE TREATMENT  Apply a thin layer TWICE A DAY on \"Mondays through Fridays ONLY\" (do not apply on weekends)  Wash hands after using this  product  Do NOT ingest this medication  \"Black Box\" warning discussed  PHOTOTHERAPY  Risks and benefits of therapy were discussed with patient/family  PRIOR AUTHORIZATION MUST BE SUBMITTED  (Phototherapy Procedure Order and Prior Authorization)  Will apply for opzelura  Risks, benefits, and alternatives of medication were discussed  Diagnosis:  Vitiligo    Total % Body Surface Area Affected by Condition at Start of Treatment:  10%    Skin Type:  II (white, fair-skin)  Usually burns; tans with difficulty    Treatment Type:  NARROWBAND UV-B (311 nm \"LOW\") with PETROLATUM (mineral oil) applied by staff as photochemotherapy at time of each visit:  CPT 98417  PLEASE SUBMIT FOR PRIOR AUTHORIZATION USING THE DOCUMENTED DESCRIPTIONS AND CPT CODES    Frequency of Treatments/Schedule:  Start at \"3 times a week\" and adjust per clinical effect and/or per protocol  Do you want a Secondary Treatment?:  No    Starting Mjoules/MED; Maximum Dose; Increase Dose Per Treatment:  Dr Austin Jones Pediatric Protocol (Condition-Specific)    Additional Comments or Recommendations:  NONE          PROCEDURES PERFORMED TODAY ASSOCIATED WITH THIS CONDITION:         " NONE      Medical Complexity:      CHRONIC ILLNESS (expected duration of >1 year):  EXACERBATION, PROGRESSION, OR SIDE EFFECTS OF TREATMENT  Acutely worsening, poorly controlled, or progressing  Intent is to control progression and requires additional supportive care or attention to treatment for side effects but not at level of consideration of hospital level of care

## 2023-05-18 ENCOUNTER — OFFICE VISIT (OUTPATIENT)
Dept: DERMATOLOGY | Facility: CLINIC | Age: 38
End: 2023-05-18

## 2023-05-18 DIAGNOSIS — L80 VITILIGO: Primary | ICD-10-CM

## 2023-05-18 NOTE — PROGRESS NOTES
PHOTOTHERAPY    Treatment type: Phototherapy  Diagnosis: Vitiligo  Anatomical location: Upper extremity, Lower extremity  Treatment schedule: 2x weekly  Reaction: None  Increase %: Other (dose held the same)  mJoules: 1216  Max dose: 2000  Topical: Mineral oil  Topical applied by: Patient  Special shield: Goggles (Pt also covers face in donohue)  Tech: Piper Hawk RN  Comments: Next appt: 5/22

## 2023-05-22 ENCOUNTER — OFFICE VISIT (OUTPATIENT)
Dept: DERMATOLOGY | Facility: CLINIC | Age: 38
End: 2023-05-22

## 2023-05-22 DIAGNOSIS — L80 VITILIGO: Primary | ICD-10-CM

## 2023-05-22 NOTE — PROGRESS NOTES
PHOTOTHERAPY    Treatment type: Phototherapy  Diagnosis: Vitiligo  Anatomical location: Upper extremity, Lower extremity  Treatment schedule: 2x weekly  Reaction: None  Increase %: Other (dose held the same per pt preference  Pt stated when the dose would increase higher above 1216, it would make her skin very itchy   Will hold at same)  mJoules: 1216  Max dose: 2000  Topical: Mineral oil  Topical applied by: Patient  Special shield: Goggles (Pt also covers face in donohue)  Tech: Debra Weinstein RN  Comments: Next appt: 5/24

## 2023-05-25 ENCOUNTER — OFFICE VISIT (OUTPATIENT)
Dept: DERMATOLOGY | Facility: CLINIC | Age: 38
End: 2023-05-25

## 2023-05-25 DIAGNOSIS — L80 VITILIGO: Primary | ICD-10-CM

## 2023-05-25 NOTE — PROGRESS NOTES
PHOTOTHERAPY    Treatment type: Phototherapy  Diagnosis: Vitiligo  Anatomical location: Upper extremity, Lower extremity  Treatment schedule: 2x weekly  Reaction: None  Increase %: Other (dose held the same)  mJoules: 1216  Max dose: 2000  Topical: Mineral oil  Topical applied by: Patient  Special shield: Goggles (Pt also covers face in donohue)  Tech: Jose Almaraz RN  Comments: Next appt: 5/30

## 2023-06-05 ENCOUNTER — OFFICE VISIT (OUTPATIENT)
Dept: DERMATOLOGY | Facility: CLINIC | Age: 38
End: 2023-06-05
Payer: COMMERCIAL

## 2023-06-05 DIAGNOSIS — L80 VITILIGO: Primary | ICD-10-CM

## 2023-06-05 PROCEDURE — 96910 PHOTCHMTX TAR&UVB/PTRLTM&UVB: CPT | Performed by: STUDENT IN AN ORGANIZED HEALTH CARE EDUCATION/TRAINING PROGRAM

## 2023-06-05 NOTE — PROGRESS NOTES
PHOTOTHERAPY    Treatment type: Phototherapy  Diagnosis: Vitiligo  Anatomical location: Upper extremity, Lower extremity  Treatment schedule: 2x weekly  Reaction: None  Increase %: Other (dose held the same)  mJoules: 1217  Max dose: 2000  Topical: Mineral oil  Topical applied by: Patient  Special shield: Goggles (Pt also covers face in donohue)  Tech: Vandana Alcantar RN  Comments: Next appt: 6/7

## 2023-06-13 ENCOUNTER — OFFICE VISIT (OUTPATIENT)
Dept: DERMATOLOGY | Facility: CLINIC | Age: 38
End: 2023-06-13
Payer: COMMERCIAL

## 2023-06-13 DIAGNOSIS — L80 VITILIGO: Primary | ICD-10-CM

## 2023-06-13 PROCEDURE — 96910 PHOTCHMTX TAR&UVB/PTRLTM&UVB: CPT | Performed by: STUDENT IN AN ORGANIZED HEALTH CARE EDUCATION/TRAINING PROGRAM

## 2023-06-13 NOTE — PROGRESS NOTES
PHOTOTHERAPY    Treatment type: Phototherapy  Diagnosis: Vitiligo  Anatomical location: Upper extremity, Lower extremity  Treatment schedule: 2x weekly  Reaction: None  Increase %: Other (dose held the same)  mJoules: 1216  Max dose: 2000  Topical: Mineral oil  Topical applied by: Patient  Special shield: Goggles (Pt also covers face in donohue)  Tech: Roz Amaya RN  Comments: Next appt: 6/15

## 2023-06-15 ENCOUNTER — OFFICE VISIT (OUTPATIENT)
Dept: DERMATOLOGY | Facility: CLINIC | Age: 38
End: 2023-06-15
Payer: COMMERCIAL

## 2023-06-15 DIAGNOSIS — L80 VITILIGO: Primary | ICD-10-CM

## 2023-06-15 PROCEDURE — 96910 PHOTCHMTX TAR&UVB/PTRLTM&UVB: CPT | Performed by: STUDENT IN AN ORGANIZED HEALTH CARE EDUCATION/TRAINING PROGRAM

## 2023-06-15 NOTE — PROGRESS NOTES
PHOTOTHERAPY    Treatment type: Phototherapy  Diagnosis: Vitiligo  Anatomical location: Upper extremity, Lower extremity  Treatment schedule: 2x weekly  Reaction: None  Increase %: Other (dose held the same)  mJoules: 1217  Max dose: 2000  Topical: Mineral oil  Topical applied by: Patient  Special shield: Goggles (Pt also covers face in donohue)  Tech: Nabila De La Rosa RN  Comments: Next appt: 6/20

## 2023-06-21 ENCOUNTER — OFFICE VISIT (OUTPATIENT)
Dept: DERMATOLOGY | Facility: CLINIC | Age: 38
End: 2023-06-21
Payer: COMMERCIAL

## 2023-06-21 DIAGNOSIS — L80 VITILIGO: Primary | ICD-10-CM

## 2023-06-21 PROCEDURE — 96910 PHOTCHMTX TAR&UVB/PTRLTM&UVB: CPT | Performed by: STUDENT IN AN ORGANIZED HEALTH CARE EDUCATION/TRAINING PROGRAM

## 2023-06-21 NOTE — PROGRESS NOTES
PHOTOTHERAPY    Treatment type: Phototherapy  Diagnosis: Vitiligo  Anatomical location: Upper extremity, Lower extremity  Treatment schedule: 2x weekly  Reaction: None  Increase %: Other (dose held the same per pt preference)  mJoules: 1217  Max dose: 2000  Topical: Mineral oil  Topical applied by: Patient  Special shield: Goggles (Pt also covers face in donohue)  Tech: Fifi De Los Santos RN  Comments: Next appt: 6/28

## 2023-06-28 ENCOUNTER — OFFICE VISIT (OUTPATIENT)
Dept: DERMATOLOGY | Facility: CLINIC | Age: 38
End: 2023-06-28
Payer: COMMERCIAL

## 2023-06-28 DIAGNOSIS — L80 VITILIGO: Primary | ICD-10-CM

## 2023-06-28 NOTE — PROGRESS NOTES
PHOTOTHERAPY    Treatment type: Phototherapy  Diagnosis: Vitiligo  Anatomical location: Upper extremity, Lower extremity  Treatment schedule: 2x weekly  Reaction: None  Increase %: Other (dose held the same)  mJoules: 1215  Max dose: 2000  Topical: Mineral oil  Topical applied by: Patient  Special shield: Goggles (Pt also covers face in donohue)  Tech: Sandie Hardwick RN  Comments: Next appt: 6/30

## 2023-07-06 ENCOUNTER — OFFICE VISIT (OUTPATIENT)
Dept: DERMATOLOGY | Facility: CLINIC | Age: 38
End: 2023-07-06
Payer: COMMERCIAL

## 2023-07-06 DIAGNOSIS — L80 VITILIGO: Primary | ICD-10-CM

## 2023-07-06 PROCEDURE — 96910 PHOTCHMTX TAR&UVB/PTRLTM&UVB: CPT | Performed by: DERMATOLOGY

## 2023-07-06 NOTE — PROGRESS NOTES
PHOTOTHERAPY    Treatment type: Phototherapy  Diagnosis: Vitiligo  Anatomical location: Upper extremity, Lower extremity  Treatment schedule: 2x weekly  Reaction: None  Increase %: Other (dose held same due to 1 week missed appointments)  mJoules: 1216  Max dose: 2000  Topical: Mineral oil  Topical applied by: Patient  Special shield: Goggles (patient also covers face in donohue)  Tech: Danielle Hubbard RN  Comments: Next appt: 7/11

## 2023-07-11 ENCOUNTER — OFFICE VISIT (OUTPATIENT)
Dept: DERMATOLOGY | Facility: CLINIC | Age: 38
End: 2023-07-11
Payer: COMMERCIAL

## 2023-07-11 DIAGNOSIS — L80 VITILIGO: Primary | ICD-10-CM

## 2023-07-11 PROCEDURE — 96910 PHOTCHMTX TAR&UVB/PTRLTM&UVB: CPT | Performed by: DERMATOLOGY

## 2023-07-13 ENCOUNTER — OFFICE VISIT (OUTPATIENT)
Dept: DERMATOLOGY | Facility: CLINIC | Age: 38
End: 2023-07-13
Payer: COMMERCIAL

## 2023-07-13 DIAGNOSIS — L80 VITILIGO: Primary | ICD-10-CM

## 2023-07-13 PROCEDURE — 96910 PHOTCHMTX TAR&UVB/PTRLTM&UVB: CPT | Performed by: DERMATOLOGY

## 2023-07-13 NOTE — PROGRESS NOTES
PHOTOTHERAPY    Treatment type: Phototherapy  Diagnosis: Vitiligo  Anatomical location: Upper extremity, Lower extremity  Treatment schedule: 2x weekly  Reaction: None  Increase %: Other (Dose ruth the same, patient preference)  mJoules: 1219 mJ  Extra UVA: No  Max dose: 2000 mJ  Topical: Mineral oil  Topical applied by: Patient  Special shield: Goggles  Tech: Sheryl Lobo; [address] [employer]  Comments: Next Appt: 8/1/23 (Patient [place] for vacation, asked to cancel all appts up until 8/1)    Scribe Attestation    I,:  Sheryl Lobo am acting as a scribe while in the presence of the attending physician.:       I,:  Braulio Craven MD personally performed the services described in this documentation    as scribed in my presence.:
Negative

## 2023-08-01 ENCOUNTER — OFFICE VISIT (OUTPATIENT)
Dept: DERMATOLOGY | Facility: CLINIC | Age: 38
End: 2023-08-01
Payer: COMMERCIAL

## 2023-08-01 DIAGNOSIS — L80 VITILIGO: Primary | ICD-10-CM

## 2023-08-01 PROCEDURE — 96910 PHOTCHMTX TAR&UVB/PTRLTM&UVB: CPT | Performed by: STUDENT IN AN ORGANIZED HEALTH CARE EDUCATION/TRAINING PROGRAM

## 2023-08-01 NOTE — PROGRESS NOTES
PHOTOTHERAPY    Treatment type: Phototherapy  Diagnosis: Vitiligo  Anatomical location: Upper extremity, Lower extremity  Treatment schedule: 2x weekly  Reaction: None  Increase %: Other (dose decreased by 25% due to pt missing 2 weeks of treatment)  mJoules: 915  Max dose: 2000 mJ  Topical: Mineral oil  Topical applied by: Patient  Special shield: Goggles (Pt also covers face in donohue)  Tech: Barbara Stevens RN  Comments: Next appt:

## 2023-08-03 ENCOUNTER — OFFICE VISIT (OUTPATIENT)
Dept: DERMATOLOGY | Facility: CLINIC | Age: 38
End: 2023-08-03
Payer: COMMERCIAL

## 2023-08-03 DIAGNOSIS — L80 VITILIGO: Primary | ICD-10-CM

## 2023-08-03 PROCEDURE — 96910 PHOTCHMTX TAR&UVB/PTRLTM&UVB: CPT | Performed by: STUDENT IN AN ORGANIZED HEALTH CARE EDUCATION/TRAINING PROGRAM

## 2023-08-03 NOTE — PROGRESS NOTES
PHOTOTHERAPY    Treatment type: Phototherapy  Diagnosis: Vitiligo  Anatomical location: Upper extremity, Lower extremity  Treatment schedule: 2x weekly  Reaction: None  Increase %: 10%  mJoules: 1005  Max dose: 2000 mJ  Topical: Mineral oil  Topical applied by: Patient  Special shield: Laurie  Tech: Albania Bah RN  Comments: Next appt: 8/8

## 2023-08-10 ENCOUNTER — OFFICE VISIT (OUTPATIENT)
Dept: DERMATOLOGY | Facility: CLINIC | Age: 38
End: 2023-08-10
Payer: COMMERCIAL

## 2023-08-10 DIAGNOSIS — L80 VITILIGO: Primary | ICD-10-CM

## 2023-08-10 PROCEDURE — 96910 PHOTCHMTX TAR&UVB/PTRLTM&UVB: CPT | Performed by: DERMATOLOGY

## 2023-08-10 NOTE — PROGRESS NOTES
PHOTOTHERAPY    Treatment type: Phototherapy  Diagnosis: Vitiligo  Anatomical location: Upper extremity, Lower extremity  Treatment schedule: 2x weekly  Reaction: None  Increase %: Other (dose held the same due to pt missing one week of treatment)  mJoules: 1005  Max dose: 2000 mJ  Topical: Mineral oil  Topical applied by: Patient  Special shield: Gojess  Tech: Krystal Jackson RN  Comments: next appt: 8/15

## 2023-08-24 ENCOUNTER — OFFICE VISIT (OUTPATIENT)
Dept: DERMATOLOGY | Facility: CLINIC | Age: 38
End: 2023-08-24
Payer: COMMERCIAL

## 2023-08-24 DIAGNOSIS — L80 VITILIGO: Primary | ICD-10-CM

## 2023-08-24 PROCEDURE — 96910 PHOTCHMTX TAR&UVB/PTRLTM&UVB: CPT | Performed by: STUDENT IN AN ORGANIZED HEALTH CARE EDUCATION/TRAINING PROGRAM

## 2023-08-24 NOTE — PROGRESS NOTES
PHOTOTHERAPY    Treatment type: Phototherapy  Diagnosis: Vitiligo  Anatomical location: Upper extremity, Lower extremity  Treatment schedule: 2x weekly  Reaction: None  Increase %: Other (dose decreased by 25% due to pt missing two weeks of treatment)  mJoules: 755  Max dose: 2000 mJ  Topical: Mineral oil  Topical applied by: Patient  Special shield: Goggles (Pt also covers face in donohue)  Tech: Dilshad Lundberg RN  Comments: Next appt: 8/

## 2023-08-29 ENCOUNTER — OFFICE VISIT (OUTPATIENT)
Dept: DERMATOLOGY | Facility: CLINIC | Age: 38
End: 2023-08-29
Payer: COMMERCIAL

## 2023-08-29 DIAGNOSIS — L80 VITILIGO: Primary | ICD-10-CM

## 2023-08-29 PROCEDURE — 96910 PHOTCHMTX TAR&UVB/PTRLTM&UVB: CPT | Performed by: STUDENT IN AN ORGANIZED HEALTH CARE EDUCATION/TRAINING PROGRAM

## 2023-08-29 NOTE — PROGRESS NOTES
PHOTOTHERAPY    Treatment type: Phototherapy  Diagnosis: Vitiligo  Anatomical location: Upper extremity, Lower extremity  Treatment schedule: 2x weekly  Reaction: None  Increase %: 10%  mJoules: 832  Max dose: 2000 mJ  Topical: Mineral oil  Topical applied by: Patient  Special shield: Goggles (Pt also covers face in donohue)  Tech: Chiara Chaparro RN  Comments: Next appt: 8/31

## 2023-09-08 ENCOUNTER — OFFICE VISIT (OUTPATIENT)
Dept: DERMATOLOGY | Facility: CLINIC | Age: 38
End: 2023-09-08
Payer: COMMERCIAL

## 2023-09-08 DIAGNOSIS — L80 VITILIGO: Primary | ICD-10-CM

## 2023-09-08 PROCEDURE — 96910 PHOTCHMTX TAR&UVB/PTRLTM&UVB: CPT | Performed by: DERMATOLOGY

## 2023-09-08 NOTE — PROGRESS NOTES
PHOTOTHERAPY    Treatment type: Phototherapy  Diagnosis: Vitiligo  Anatomical location: Upper extremity, Lower extremity  Treatment schedule: 2x weekly  Reaction: None  Increase %: Other (dose held the same due to missed appts)  mJoules: 833  Max dose: 2000 mJ  Topical: Mineral oil  Topical applied by: Patient  Special shield: Goggles  Tech: Austen Sanford RN  Comments: Next appt: 9/15

## 2023-09-15 ENCOUNTER — OFFICE VISIT (OUTPATIENT)
Dept: DERMATOLOGY | Facility: CLINIC | Age: 38
End: 2023-09-15
Payer: COMMERCIAL

## 2023-09-15 DIAGNOSIS — L80 VITILIGO: Primary | ICD-10-CM

## 2023-09-15 PROCEDURE — 96910 PHOTCHMTX TAR&UVB/PTRLTM&UVB: CPT | Performed by: DERMATOLOGY

## 2023-09-15 NOTE — PROGRESS NOTES
PHOTOTHERAPY    Treatment type: Phototherapy  Diagnosis: Vitiligo  Anatomical location: Upper extremity, Lower extremity  Treatment schedule: 2x weekly  Reaction: None  Increase %: Other (dose held the same)  mJoules: 832  Max dose: 2000 mJ  Topical: Mineral oil  Topical applied by: Patient  Special shield: Gojess  Tech: Hugo Mcintosh RN  Comments: Next appt: 9/22

## 2023-09-22 ENCOUNTER — OFFICE VISIT (OUTPATIENT)
Dept: DERMATOLOGY | Facility: CLINIC | Age: 38
End: 2023-09-22
Payer: COMMERCIAL

## 2023-09-22 DIAGNOSIS — L80 VITILIGO: Primary | ICD-10-CM

## 2023-09-22 PROCEDURE — 96910 PHOTCHMTX TAR&UVB/PTRLTM&UVB: CPT | Performed by: DERMATOLOGY

## 2023-09-22 NOTE — PROGRESS NOTES
PHOTOTHERAPY    Treatment type: Phototherapy  Diagnosis: Vitiligo  Anatomical location: Upper extremity, Lower extremity  Treatment schedule: 2x weekly  Reaction: None  Increase %: Other (dose held same)  mJoules: 832  Extra UVA: No  Max dose: 2000 mJ  Topical: Mineral oil  Topical applied by: Patient  Special shield: Goggles  Tech: Shayan Stevens RN  Comments: Next appt: 9/25

## 2023-09-29 ENCOUNTER — OFFICE VISIT (OUTPATIENT)
Dept: DERMATOLOGY | Facility: CLINIC | Age: 38
End: 2023-09-29
Payer: COMMERCIAL

## 2023-09-29 DIAGNOSIS — L80 VITILIGO: Primary | ICD-10-CM

## 2023-09-29 PROCEDURE — 96910 PHOTCHMTX TAR&UVB/PTRLTM&UVB: CPT | Performed by: DERMATOLOGY

## 2023-09-29 NOTE — PROGRESS NOTES
PHOTOTHERAPY    Treatment type: Phototherapy  Diagnosis: Vitiligo  Anatomical location: Upper extremity, Lower extremity  Treatment schedule: 2x weekly  Reaction: None  Increase %: Other (dose held the same)  mJoules: 833  Max dose: 2000 mJ  Topical: Mineral oil  Topical applied by: Patient  Special shield: Gojess  Tech: Luis Miguel Holland RN  Comments: Next appt: TBD based on new insurance

## 2023-10-02 ENCOUNTER — TELEPHONE (OUTPATIENT)
Dept: DERMATOLOGY | Facility: CLINIC | Age: 38
End: 2023-10-02

## 2023-10-02 NOTE — TELEPHONE ENCOUNTER
Telephone call to pt's new insurance company, [facility], seeing if prescribed phototherapy treatments under the CPT code 19871 are covered. The rep stated no PA is needed and the treatments are covered at 100% with no copay. Reference number: [id number].

## 2023-10-20 ENCOUNTER — OFFICE VISIT (OUTPATIENT)
Dept: DERMATOLOGY | Facility: CLINIC | Age: 38
End: 2023-10-20

## 2023-10-20 DIAGNOSIS — L80 VITILIGO: Primary | ICD-10-CM

## 2023-10-20 NOTE — PROGRESS NOTES
PHOTOTHERAPY    Treatment type: Phototherapy  Diagnosis: Vitiligo  Anatomical location: Upper extremity, Lower extremity  Treatment schedule: 1x weekly  Reaction: None  Increase %: Other (dose decreased by 50% due to pt missing 3 weeks of treatment)  mJoules: 417  Max dose: 2000 mJ  Topical: Mineral oil  Topical applied by: Patient  Special shield: Goggles  Tech: Carine Canales RN  Comments: Next appt: 10/26

## 2023-10-26 ENCOUNTER — OFFICE VISIT (OUTPATIENT)
Dept: DERMATOLOGY | Facility: CLINIC | Age: 38
End: 2023-10-26
Payer: COMMERCIAL

## 2023-10-26 DIAGNOSIS — L80 VITILIGO: Primary | ICD-10-CM

## 2023-10-26 PROCEDURE — 96910 PHOTCHMTX TAR&UVB/PTRLTM&UVB: CPT | Performed by: DERMATOLOGY

## 2023-10-26 NOTE — PROGRESS NOTES
PHOTOTHERAPY    Treatment type: Phototherapy  Diagnosis: Vitiligo  Anatomical location: Upper extremity, Lower extremity  Treatment schedule: 1x weekly  Reaction: None  Increase %: 10%  mJoules: 458  Max dose: 2000 mJ  Topical: Mineral oil  Topical applied by: Patient  Special shield: Laurie  Tech: Hugo Mcintosh RN  Comments: Next appt: 11/3

## 2023-11-10 ENCOUNTER — OFFICE VISIT (OUTPATIENT)
Dept: DERMATOLOGY | Facility: CLINIC | Age: 38
End: 2023-11-10
Payer: COMMERCIAL

## 2023-11-10 DIAGNOSIS — L80 VITILIGO: Primary | ICD-10-CM

## 2023-11-10 PROCEDURE — 96910 PHOTCHMTX TAR&UVB/PTRLTM&UVB: CPT | Performed by: DERMATOLOGY

## 2023-11-10 NOTE — PROGRESS NOTES
PHOTOTHERAPY    Treatment type: Phototherapy  Diagnosis: Vitiligo  Anatomical location: Upper extremity, Lower extremity  Treatment schedule: 1x weekly  Reaction: None  Increase %: Other (dose decreased by 25% due to patient missing two weeks of treatment)  mJoules: 344  Extra UVA: No  Max dose: 2000 mJ  Topical: Mineral oil  Topical applied by: Patient  Special shield: Laurie Montalvo RN  Comments: 11/17

## 2023-11-15 ENCOUNTER — OFFICE VISIT (OUTPATIENT)
Dept: DERMATOLOGY | Facility: CLINIC | Age: 38
End: 2023-11-15
Payer: COMMERCIAL

## 2023-11-15 ENCOUNTER — TELEPHONE (OUTPATIENT)
Dept: INTERNAL MEDICINE CLINIC | Facility: CLINIC | Age: 38
End: 2023-11-15

## 2023-11-15 VITALS — HEIGHT: 60 IN | WEIGHT: 139 LBS | TEMPERATURE: 97.1 F | BODY MASS INDEX: 27.29 KG/M2

## 2023-11-15 DIAGNOSIS — L80 VITILIGO: Primary | ICD-10-CM

## 2023-11-15 PROCEDURE — 99214 OFFICE O/P EST MOD 30 MIN: CPT | Performed by: STUDENT IN AN ORGANIZED HEALTH CARE EDUCATION/TRAINING PROGRAM

## 2023-11-15 PROCEDURE — 99213 OFFICE O/P EST LOW 20 MIN: CPT | Performed by: STUDENT IN AN ORGANIZED HEALTH CARE EDUCATION/TRAINING PROGRAM

## 2023-11-15 NOTE — PROGRESS NOTES
PHOTOTHERAPY    Treatment type: Phototherapy  Diagnosis: Vitiligo  Anatomical location: Upper extremity, Lower extremity  Treatment schedule: 1x weekly  Reaction: None  Increase %: 10%  mJoules: 379  Max dose: 2000 mJ  Topical: Mineral oil  Topical applied by: Patient  Special shield: Laurie  Tech: Pamella Ray RN  Comments: Next appt: 11/22

## 2023-11-15 NOTE — PROGRESS NOTES
[facility] Note     Patient Name: Stephani Son  Encounter Date: 11/15/2023     Have you been cared for by a [facility]ologist in the last 3 years and, if so, which description applies to you? Yes. I have been here within the last 3 years, and my medical history has NOT changed since that time. I am FEMALE/of child-bearing potential.    REVIEW OF SYSTEMS:  Have you recently had or currently have any of the following? No changes in my recent health. PAST MEDICAL HISTORY:  Have you personally ever had or currently have any of the following? If "YES," then please provide more detail. No changes in my medical history. HISTORY OF IMMUNOSUPPRESSION: Do you have a history of any of the following:  Systemic Immunosuppression such as Diabetes, Biologic or Immunotherapy, Chemotherapy, Organ Transplantation, Bone Marrow Transplantation? YES, Diabetes     Answering "YES" requires the addition of the dotphrase "IMMUNOSUPPRESSED" as the first diagnosis of the patient's visit. FAMILY HISTORY:  Any "first degree relatives" (parent, brother, sister, or child) with the following? No changes in my family's known health. PATIENT EXPERIENCE:    Do you want the Dermatologist to perform a COMPLETE skin exam today including a clinical examination under the "bra and underwear" areas? NO  If necessary, do we have your permission to call and leave a detailed message on your Preferred Phone number that includes your specific medical information?   Yes      Allergies   Allergen Reactions    Augmentin [Amoxicillin-Pot Clavulanate] Rash      Current Outpatient Medications:     Biotin 1 MG CAPS, Take by mouth, Disp: , Rfl:     calcium carbonate-vitamin D (OSCAL-D) 500 mg-200 units per tablet, Take 1 tablet by mouth 3 (three) times a day with meals, Disp: 90 tablet, Rfl: 0    Fiasp 100 UNIT/ML SOLN, USE IN INSULIN PUMP FOR TOTAL DAILY DOSE 60 UNITS DAILY, Disp: , Rfl:     multivitamin (THERAGRAN) TABS, Take 1 tablet by mouth daily, Disp: , Rfl:     tacrolimus (PROTOPIC) 0.1 % ointment, Apply topically 2 (two) times a day, Disp: 60 g, Rfl: 1    Accu-Chek FastClix Lancets MISC, USE TO TEST BLOOD SUGAR 6 TIMES A DAY, Disp: , Rfl:     Accu-Chek Guide test strip, USE UP TO 6 TIMES A DAY, Disp: , Rfl:     Admelog 100 UNIT/ML injection, , Disp: , Rfl:     insulin aspart (NovoLOG) 100 units/mL injection, Inject under the skin continuous. Via pump (Patient not taking: Reported on 5/17/2023), Disp: , Rfl:     insulin lispro (HumaLOG) 100 units/mL injection, INJECT 60 UNITS VIA INSULIN PUMP DAILY (Patient not taking: Reported on 5/17/2023), Disp: , Rfl:     naproxen (NAPROSYN) 500 mg tablet, Take 500 mg by mouth (Patient not taking: Reported on 5/17/2023), Disp: , Rfl:           Whom besides the patient is providing clinical information about today's encounter? NO ADDITIONAL HISTORIAN (patient alone provided history)    Physical Exam and Assessment/Plan by Diagnosis:  VITILIGO--IMPROVING    Physical Exam:  Anatomic Location Affected:  Upper extremity, Lower extremity  Morphological Description:  improving (white plaques with scattered pigmentation)--> 5% BSA  Pertinent Positives:  Pertinent Negatives: Additional History of Present Condition:  Patient does phototherapy 1-2x a week since 11/2021. She applies protopic twice a day. She states she sees improvement.      Assessment and Plan:  Based on a thorough discussion of this condition and the management approach to it (including a comprehensive discussion of the known risks, side effects and potential benefits of treatment), the patient (family) agrees to implement the following specific plan:  Discussed pros and cons Opzelura--patient defers for now  Continue topical protopic twice a day  Continue phototherapy 1-2x/week    Vitiligo  Vitiligo is an acquired depigmenting disorder of the skin, in which pigment cells (melanocytes) are lost. It presents with well-defined milky-white patches of skin (leukoderma). Vitiligo can be cosmetically very disabling, particularly in people with dark skin. Vitiligo affects 0.5-1% of the population, and occurs in all races. It may be more common in [facility] than elsewhere, with reports of up to 8.8% of the population affected. In 50% of sufferers, pigment loss begins before the age of 21, and in about 80% it begins before the age of 27 years. In 20%, other family members also have vitiligo. Males and females are equally affected. Even though most people with vitiligo are in good general health, they face a greater risk of having autoimmune diseases such as diabetes, thyroid disease (in 20% of patients over 20 years with vitiligo), pernicious anemia (B12 deficiency), [place] disease (adrenal gland disease), systemic lupus erythematosus, rheumatoid arthritis, psoriasis, and alopecia areata (round patches of hair loss). A vitiligo-like leukoderma may occur in patients with metastatic melanoma. It can also be induced by certain drugs, such as immune checkpoint inhibitors (pembrolizumab, nivolumab) and BRAF inhibitors (vemurafenib, dabrafenib) used to treat metastatic melanoma. Vitiligo is also three times more common in hematology patients that have had allogeneic bone marrow and stem-cell transplants, than in the normal population. What causes vitiligo? Vitiligo is due to the loss or destruction of melanocytes, which are the cells that produce melanin. Melanin determines the color of skin, hair, and eyes. If melanocytes cannot form melanin or if their number decreases, skin color becomes progressively lighter. Vitiligo is thought to be a systemic autoimmune disorder, associated with deregulated innate immune response, although this has been disputed for segmental vitiligo. There is a genetic susceptibility. Vitiligo is a component of some rare syndromes, such as the Vogt-Koyanagi-Harada syndrome.  The gene encoding the melanocyte enzyme tyrosinase, TYR, is likely involved. It has been reported to be drug induced in association with the methylphenidate transdermal system. What are the clinical features of vitiligo? Vitiligo can affect any part of the body. Complete loss of pigment can affect a single patch of skin, or it may affect multiple patches. Small patches or macules are sometimes described as confetti-like. Common sites are exposed areas (face, neck, eyelids, nostrils, fingertips and toes), body folds (armpits, groin), nipples, navel, lips and genitalia. Vitiligo also favors sites of injury (cuts, scrapes, acne, thermal burns and sunburn). This is called the Koebner phenomenon. New-onset vitiligo also sometimes follows emotional stress. Vitiligo may occasionally start as multiple halo naevi. Loss of color may also affect the hair on the scalp, eyebrows, eyelashes and body. White hair is called 'leukotrichia' or 'poliosis'. The retina at the back of the eye may also be affected. However, the colour of the iris does not change. The color of the edge of the white patch can vary. It is usually the color of unaffected skin, but sometimes it is hyper pigmented or hypo pigmented. The term trichrome vitiligo is used to describe three shades of skin color. Very rarely, there are four shades of pigment (white, pale brown, dark brown and normal skin). Occasionally, each patch of vitiligo has an inflamed red border. The severity of vitiligo differs with each person. There is no way to predict how much pigment an individual will lose or how fast it will be lost.  Vitiligo appears more obvious in patients with naturally dark skin. Extension of vitiligo can occur over a few months, and then it stabilizes. Some spontaneous regimentation may occur. Brown spots arise from the hair follicles, and the overall size of the white patch may reduce. At some time in the future, the vitiligo begins to extend again.   Cycles of pigment loss followed by periods of stability may continue indefinitely. Light skinned people usually notice the pigment loss during the summer as the contrast between the affected skin and suntanned skin becomes more distinct. Pigment has occasionally been reported to be lost from the entire skin surface. Vitiligo may be associated with a reduced risk of malignancy  It has been observed that patients that develop a form of vitiligo during treatment with an immune checkpoint inhibitor (such as nivolumab or pembrolizumab prescribed for metastatic melanoma) have enhanced survival rates compared to those who do not develop this complication of the treatment. A nationwide population cohort study published in 2019 reported that patients in [place] with a diagnosis of vitiligo had a reduced incidence of internal malignancies such as cancer of the colon, rectum, ovary and lung. The risk of melanoma and keratinocyte cancer also appears to be less in vitiligo patients than in similar patients without vitiligo. However, it should be noted that the risk of thyroid cancer is greater in patients with vitiligo compared to patients without vitiligo. How is vitiligo diagnosed? Vitiligo is normally a clinical diagnosis, and no tests are necessary to make the diagnosis. The white patches may be seen more easily under Wood lamp examination (black light). Occasionally skin biopsy may be recommended, particularly in early or inflammatory vitiligo, when a lymphocytic infiltration may be observed. Melanocytes and epidermal pigment are absent in established vitiligo patches. Blood tests to assess other potential autoimmune diseases or polyglandular syndromes may be arranged, such as thyroid function, B12 levels and autoantibody screen. Clinical photographs are useful to document the extent of vitiligo for monitoring. Serial digital images may be arranged on follow-up.  The extent of vitiligo may be scored according to the body surface area affected by depigmentation. How is vitiligo treated? Treatment of vitiligo is currently unsatisfactory. Repigmentation treatment is most successful on face and trunk; hands, feet and areas with white hair respond poorly. Compared to longstanding patches, new ones are more likely to respond to medical therapy. When successful regimentation occurs, melanocyte stem cells in the bulb at the base of the hair follicle are activated and migrate to the skin surface. They appear as perifollicular brown macules. General measures  Minimize skin injury: wear protective clothing. A cut, a graze, a scratch may lead to a new patch of vitiligo. Cosmetic camouflage can disguise vitiligo. Options include:  Make-up, dyes and stains  Waterproof products  Dihydroxyacetone-containing products "tan without sun"  Micropigmentation or tattooing for stable vitiligo. White skin can only burn on exposure to ultraviolet radiation (UVR); it cannot tan. Sunburn may cause vitiligo to spread. Tanning of normal skin makes vitiligo patches appear more obvious. Topical treatments  Corticosteroid creams. These can be used for vitiligo on trunk and limbs for up to 3 months. Potent steroids should be avoided on thin-skinned areas of the face (especially eyelids), neck, armpits and groin. Calcineurin inhibitors (pimecrolimus cream and tacrolimus ointment. These can be used for vitiligo affecting eyelids, face, neck, armpits and groin. Experimental treatment with topical ruxolitinib, a Janus kinase inhibitor,  shows great promise for facial vitiligo. Phototherapy  Phototherapy refers to treatment with ultraviolet (UV) radiation. Options include:  Whole-body or localized broadband or narrowband (311 nm) UVB  Excimer laser UVB (308 nm) or targeted UVB for small areas of vitiligo  Oral, topical, or bathwater photo chemotherapy (PUVA)  In-office or home phototherapy.     Treatment is usually given twice weekly for a trial period of 3-4 months. If repigmentation is observed, treatment is continued until repigmentation is complete or for a maximum of 1-2 years. Phototherapy is unsuitable for very fair skinned people. The treatment intensity aims for the vitiligo skin to be a light "carnation" pink. If repigmentation is observed, treatment is continued until repigmentation is complete or for a maximum of 1-2 years. Treatment times are generally brief. The aim is to cause the treated skin to appear very slightly pink the following day. It is important to avoid burning (red, blistered, peeling, itchy or painful skin), as this could cause the vitiligo to get worse. Systemic therapy  Systemic treatments for vitiligo include:  Oral minocycline, a tetracycline antibiotic with anti-inflammatory properties  Mini-pulses of oral steroids for 3 to 6 months, such as dexamethasone 2.5 mg, two days per week  Subcutaneous afamelanotide. Surgical treatment of stable vitiligo  Surgical treatment for stable and segmental vitiligo requires removal of the top layer of vitiligo skin (by shaving, dermabrasion, sandpapering or laser) and replacement with pigmented skin removed from another site. Techniques include:  Non-cultured melanocyte-keratinocyte cell suspension transplantation. Punch grafting  Blister grafts, formed by suction or cryotherapy  Split skin grafting  Cultured autografts of melanocytes grown in tissue culture. Depigmentation therapy  Depigmentation therapy, using monobenzyl ether of hydroquinone, may be considered in severely affected, dark-skinned individuals. Cryotherapy and laser treatment (eg, 755 nm Q-switched alexandrite or 694 nm Q-switched ruslan) have also been used successfully to depigment small areas of vitiligo. Psychosocial effects of vitiligo  Vitiligo results in reduced quality of life and psychological difficulties in many patients, especially in adolescents and in females.  The psychosocial effects of vitiligo tend to be more severe in some countries, cultures and religions than in others. Family support, counselling and cognitive behavioral treatment can be of benefit.   Scribe Attestation      I,:  Tammy Patino MA am acting as a scribe while in the presence of the attending physician.:       I,:  Laura De Dios MD personally performed the services described in this documentation    as scribed in my presence.:

## 2024-01-05 ENCOUNTER — CLINICAL SUPPORT (OUTPATIENT)
Dept: DERMATOLOGY | Facility: CLINIC | Age: 39
End: 2024-01-05
Payer: COMMERCIAL

## 2024-01-05 DIAGNOSIS — L80 VITILIGO: Primary | ICD-10-CM

## 2024-01-05 PROCEDURE — 96910 PHOTCHMTX TAR&UVB/PTRLTM&UVB: CPT | Performed by: DERMATOLOGY

## 2024-01-05 NOTE — PROGRESS NOTES
PHOTOTHERAPY    Treatment type: Phototherapy  Diagnosis: Vitiligo  Anatomical location: Upper extremity, Lower extremity  Treatment schedule: 1x weekly  Reaction: None  Increase %: Other (restarting dose due to missed appointments)  mJoules: 300  Extra UVA: No  Max dose: 2000 mJ  Topical: Mineral oil  Topical applied by: Patient  Special shield: Goggles  Tech: Amanda DOS SANTOS RN  Comments: Next appt: 1/12

## 2024-01-12 ENCOUNTER — CLINICAL SUPPORT (OUTPATIENT)
Dept: DERMATOLOGY | Facility: CLINIC | Age: 39
End: 2024-01-12
Payer: COMMERCIAL

## 2024-01-12 DIAGNOSIS — L80 VITILIGO: Primary | ICD-10-CM

## 2024-01-12 PROCEDURE — 96910 PHOTCHMTX TAR&UVB/PTRLTM&UVB: CPT | Performed by: STUDENT IN AN ORGANIZED HEALTH CARE EDUCATION/TRAINING PROGRAM

## 2024-01-12 NOTE — PROGRESS NOTES
PHOTOTHERAPY    Treatment type: Phototherapy  Visit number: 2  Diagnosis: Vitiligo  Anatomical location: Upper extremity, Lower extremity  Treatment schedule: 1x weekly  Reaction: None  Increase %: 10%  Extra UVA: No  Max dose: 2000 mJ  Topical: Mineral oil  Topical applied by: Patient  Special shield: Laurie  Tech: Amanda DOS SANTOS RN  Comments: Next appt: 01/18/24      Patient does not have active order in the chart for phototherapy or signed phototherapy consent. Verbal consent obtained today for phototherapy. Her appointment for next week has been changed to Thursday when Dr.. Galvan is present to get the phototherapy consent physically signed.      Order for Phototherapy needs to be placed as well. Used information from the note during the last appointment to continue therapy today.

## 2024-01-16 DIAGNOSIS — L80 VITILIGO: Primary | ICD-10-CM

## 2024-01-18 DIAGNOSIS — L80 VITILIGO: Primary | ICD-10-CM

## 2024-02-21 PROBLEM — E10.10 DIABETIC KETOACIDOSIS WITHOUT COMA ASSOCIATED WITH TYPE 1 DIABETES MELLITUS (HCC): Status: RESOLVED | Noted: 2020-09-23 | Resolved: 2024-02-21

## 2024-03-20 ENCOUNTER — TELEPHONE (OUTPATIENT)
Age: 39
End: 2024-03-20

## 2024-03-20 NOTE — TELEPHONE ENCOUNTER
Patient has not been in for phototherapy in a few months and called to get back on the schedule.     She wasn't having any issues with her treatment before stopping. And she is scheduled to come in for a follow up on 5/22.     Advised that I would send a message back to the doctor to confirm that continuing on phototherapy would be okay since she hasn't been in.

## 2024-05-16 ENCOUNTER — TELEPHONE (OUTPATIENT)
Dept: DERMATOLOGY | Facility: CLINIC | Age: 39
End: 2024-05-16

## 2024-06-12 ENCOUNTER — CLINICAL SUPPORT (OUTPATIENT)
Dept: DERMATOLOGY | Facility: CLINIC | Age: 39
End: 2024-06-12
Payer: COMMERCIAL

## 2024-06-12 DIAGNOSIS — L80 VITILIGO CAPITIS: Primary | ICD-10-CM

## 2024-06-12 DIAGNOSIS — L80 VITILIGO: ICD-10-CM

## 2024-06-12 DIAGNOSIS — L80 VITILIGO: Primary | ICD-10-CM

## 2024-06-12 PROCEDURE — 96910 PHOTCHMTX TAR&UVB/PTRLTM&UVB: CPT | Performed by: STUDENT IN AN ORGANIZED HEALTH CARE EDUCATION/TRAINING PROGRAM

## 2024-06-12 NOTE — PROGRESS NOTES
PHOTOTHERAPY    Treatment type: Phototherapy  Visit number: 1  Diagnosis: Vitiligo  Anatomical location: Upper extremity, Lower extremity  Severity: Mild  BSA: Other  Treatment schedule: 3x weekly (5%)  Reaction: None  Increase %: 10%  mJoules: 200mJ  Max dose: 2000 mJ  Topical: Mineral oil  Topical applied by: Patient  Special shield: Laurie  Tech: DAPHNIE Edwards  Comments: Next appt: 06/19/2024        Grace Hsieh

## 2024-06-19 ENCOUNTER — CLINICAL SUPPORT (OUTPATIENT)
Dept: DERMATOLOGY | Facility: CLINIC | Age: 39
End: 2024-06-19
Payer: COMMERCIAL

## 2024-06-19 DIAGNOSIS — L80 VITILIGO: Primary | ICD-10-CM

## 2024-06-19 PROCEDURE — 96910 PHOTCHMTX TAR&UVB/PTRLTM&UVB: CPT | Performed by: STUDENT IN AN ORGANIZED HEALTH CARE EDUCATION/TRAINING PROGRAM

## 2024-06-19 NOTE — PROGRESS NOTES
PHOTOTHERAPY    Treatment type: Phototherapy  Visit number: 2  Diagnosis: Vitiligo  Anatomical location: Upper extremity, Lower extremity  Severity: Mild  Treatment schedule: 1x weekly  Reaction: None  Increase %: 10%  mJoules: 221mJ  Max dose: 2000 mJ  Topical: Mineral oil  Topical applied by: Patient  Special shield: Laurie  Tech: DAPHNIE Edwards  Comments: Next appt: 06/26/2024      Grace Hsieh

## 2024-06-26 ENCOUNTER — CLINICAL SUPPORT (OUTPATIENT)
Dept: DERMATOLOGY | Facility: CLINIC | Age: 39
End: 2024-06-26
Payer: COMMERCIAL

## 2024-06-26 DIAGNOSIS — L80 VITILIGO: Primary | ICD-10-CM

## 2024-06-26 PROCEDURE — 96910 PHOTCHMTX TAR&UVB/PTRLTM&UVB: CPT | Performed by: STUDENT IN AN ORGANIZED HEALTH CARE EDUCATION/TRAINING PROGRAM

## 2024-06-26 NOTE — PROGRESS NOTES
PHOTOTHERAPY    Treatment type: Phototherapy  Visit number: 3  Diagnosis: Vitiligo  Anatomical location: Upper extremity, Lower extremity  Severity: Mild  Treatment schedule: 1x weekly  Reaction: None  Increase %: 10%  mJoules: 243mJ  Max dose: 2000 mJ  Topical: Mineral oil  Topical applied by: Patient  Special shield: Laurie  Tech: DAPHNIE Edwards  Comments: Next appt: 07/03/2024    Grace Hsieh

## 2024-07-10 ENCOUNTER — CLINICAL SUPPORT (OUTPATIENT)
Dept: DERMATOLOGY | Facility: CLINIC | Age: 39
End: 2024-07-10
Payer: COMMERCIAL

## 2024-07-10 DIAGNOSIS — L80 VITILIGO: Primary | ICD-10-CM

## 2024-07-10 PROCEDURE — 96910 PHOTCHMTX TAR&UVB/PTRLTM&UVB: CPT | Performed by: STUDENT IN AN ORGANIZED HEALTH CARE EDUCATION/TRAINING PROGRAM

## 2024-07-10 NOTE — PROGRESS NOTES
PHOTOTHERAPY    Treatment type: Phototherapy  Visit number: 4  Diagnosis: Vitiligo  Anatomical location: Upper extremity, Lower extremity  Severity: Mild  Treatment schedule: 1x weekly  Reaction: None  Increase %: Other (restarting at 200mJ, missed 2 weeks)  mJoules: 200mJ  Max dose: 2000 mJ  Topical: Mineral oil  Topical applied by: Patient  Special shield: Laurie  Tech: DAPHNIE Edwards  Comments: Next appt: 07/17/2024      Grace Hsieh

## 2025-02-06 NOTE — PROGRESS NOTES
PHOTOTHERAPY    Treatment type: Phototherapy  Diagnosis: Vitiligo  Anatomical location: Upper extremity, Lower extremity  Treatment schedule: 2x weekly  Reaction: None  Increase %: Other (dose held the same per patient preference)  mJoules: 1217 mJ  Extra UVA: No  Max dose: 2000 mJ  Topical: Mineral oil  Topical applied by: Patient  Special shield: Goggles  Tech: Vin Valles; [address]  Comments: Next Appt: 7/13/23    Scribe Attestation    I,:  Vin Valles am acting as a scribe while in the presence of the attending physician.:       I,:  Gary Cote MD personally performed the services described in this documentation    as scribed in my presence.: Recent Visits  Date Type Provider Dept   10/08/24 Office Visit Wesly Montano, DO [facility]   Showing recent visits within past 180 days and meeting all other requirements  Future Appointments  Date Type Provider Dept   04/08/25 Appointment Wesly Montano DO [facility]   Showing future appointments within next 90 days and meeting all other requirements